# Patient Record
Sex: FEMALE | Race: WHITE
[De-identification: names, ages, dates, MRNs, and addresses within clinical notes are randomized per-mention and may not be internally consistent; named-entity substitution may affect disease eponyms.]

---

## 2019-08-06 ENCOUNTER — HOSPITAL ENCOUNTER (OUTPATIENT)
Dept: HOSPITAL 53 - M WHC | Age: 74
End: 2019-08-06
Attending: STUDENT IN AN ORGANIZED HEALTH CARE EDUCATION/TRAINING PROGRAM
Payer: MEDICARE

## 2019-08-06 DIAGNOSIS — M85.89: ICD-10-CM

## 2019-08-06 DIAGNOSIS — Z13.820: Primary | ICD-10-CM

## 2019-08-08 NOTE — DEXA
AP SPINE   L1 - L4   1.469    2.2      4.0

LT FEMUR   TOTAL   1.062    0.4      2.2

LT NECK      0.960   -0.6      1.4

RT FEMUR   TOTAL   1.084    0.6      2.4      

RT NECK      0.979   -0.4      1.5

TOTAL BODY   TOTAL

OTHER



COMMENTS:

Normal bone densitometry of the spine and hips.

The density of the spine has increased 5.7% since 09/27/2013.

The density of the left hip has increased 2.5% since 09/27/2013.

The density of the right hip has increased 2.9% since 09/27/2013.

The increased density of the spine does represent a significant change.

The increased density of the left hip does represent a significant change.

The increased density of the right hip does represent a significant change.



FOLLOW-UP:

Recommendation for the next bone density exam: 5 years.



FUAD

## 2019-11-25 ENCOUNTER — HOSPITAL ENCOUNTER (OUTPATIENT)
Dept: HOSPITAL 53 - M LAB REF | Age: 74
End: 2019-11-25
Attending: PHYSICIAN ASSISTANT
Payer: MEDICARE

## 2019-11-25 DIAGNOSIS — N39.0: Primary | ICD-10-CM

## 2019-11-25 LAB
APPEARANCE UR: CLEAR
BACTERIA UR QL AUTO: NEGATIVE
BILIRUB UR QL STRIP.AUTO: NEGATIVE
GLUCOSE UR QL STRIP.AUTO: (no result) MG/DL
HGB UR QL STRIP.AUTO: NEGATIVE
KETONES UR QL STRIP.AUTO: (no result) MG/DL
LEUKOCYTE ESTERASE UR QL STRIP.AUTO: NEGATIVE
NITRITE UR QL STRIP.AUTO: NEGATIVE
PH UR STRIP.AUTO: 6 UNITS (ref 5–9)
PROT UR QL STRIP.AUTO: NEGATIVE MG/DL
RBC # UR AUTO: 0 /HPF (ref 0–3)
SP GR UR STRIP.AUTO: 1.03 (ref 1–1.03)
SQUAMOUS #/AREA URNS AUTO: 0 /HPF (ref 0–6)
UROBILINOGEN UR QL STRIP.AUTO: 0.2 MG/DL (ref 0–2)
WBC #/AREA URNS AUTO: 1 /HPF (ref 0–3)

## 2019-12-02 ENCOUNTER — HOSPITAL ENCOUNTER (OUTPATIENT)
Dept: HOSPITAL 53 - M ED | Age: 74
Setting detail: OBSERVATION
LOS: 1 days | Discharge: HOME HEALTH | End: 2019-12-03
Attending: INTERNAL MEDICINE | Admitting: INTERNAL MEDICINE
Payer: MEDICARE

## 2019-12-02 VITALS — BODY MASS INDEX: 26.13 KG/M2 | HEIGHT: 62 IN | WEIGHT: 141.98 LBS

## 2019-12-02 VITALS — DIASTOLIC BLOOD PRESSURE: 67 MMHG | SYSTOLIC BLOOD PRESSURE: 143 MMHG

## 2019-12-02 DIAGNOSIS — Z88.0: ICD-10-CM

## 2019-12-02 DIAGNOSIS — Z88.5: ICD-10-CM

## 2019-12-02 DIAGNOSIS — Z79.899: ICD-10-CM

## 2019-12-02 DIAGNOSIS — R42: ICD-10-CM

## 2019-12-02 DIAGNOSIS — I10: ICD-10-CM

## 2019-12-02 DIAGNOSIS — K21.9: ICD-10-CM

## 2019-12-02 DIAGNOSIS — Z88.2: ICD-10-CM

## 2019-12-02 DIAGNOSIS — E87.1: ICD-10-CM

## 2019-12-02 DIAGNOSIS — E11.65: Primary | ICD-10-CM

## 2019-12-02 DIAGNOSIS — E78.49: ICD-10-CM

## 2019-12-02 DIAGNOSIS — E87.6: ICD-10-CM

## 2019-12-02 DIAGNOSIS — E03.9: ICD-10-CM

## 2019-12-02 LAB
ALBUMIN SERPL BCG-MCNC: 3.2 GM/DL (ref 3.2–5.2)
ALBUMIN SERPL BCG-MCNC: 3.4 GM/DL (ref 3.2–5.2)
ALT SERPL W P-5'-P-CCNC: 40 U/L (ref 12–78)
ALT SERPL W P-5'-P-CCNC: 43 U/L (ref 12–78)
B-OH-BUTYR SERPL-MCNC: 11.71 MG/DL (ref ?–2.81)
BASOPHILS # BLD AUTO: 0.1 10^3/UL (ref 0–0.2)
BASOPHILS NFR BLD AUTO: 0.8 % (ref 0–1)
BILIRUB CONJ SERPL-MCNC: 0.3 MG/DL (ref 0–0.2)
BILIRUB CONJ SERPL-MCNC: 0.3 MG/DL (ref 0–0.2)
BILIRUB SERPL-MCNC: 1 MG/DL (ref 0.2–1)
BILIRUB SERPL-MCNC: 1.3 MG/DL (ref 0.2–1)
BUN SERPL-MCNC: 17 MG/DL (ref 7–18)
BUN SERPL-MCNC: 21 MG/DL (ref 7–18)
CALCIUM SERPL-MCNC: 9.1 MG/DL (ref 8.8–10.2)
CALCIUM SERPL-MCNC: 9.9 MG/DL (ref 8.8–10.2)
CHLORIDE SERPL-SCNC: 84 MEQ/L (ref 98–107)
CHLORIDE SERPL-SCNC: 93 MEQ/L (ref 98–107)
CO2 SERPL-SCNC: 34 MEQ/L (ref 21–32)
CO2 SERPL-SCNC: 35 MEQ/L (ref 21–32)
CREAT SERPL-MCNC: 0.77 MG/DL (ref 0.55–1.3)
CREAT SERPL-MCNC: 1.08 MG/DL (ref 0.55–1.3)
EOSINOPHIL # BLD AUTO: 0.2 10^3/UL (ref 0–0.5)
EOSINOPHIL NFR BLD AUTO: 2.5 % (ref 0–3)
EST. AVERAGE GLUCOSE BLD GHB EST-MCNC: 344 MG/DL (ref 60–110)
GFR SERPL CREATININE-BSD FRML MDRD: 52.7 ML/MIN/{1.73_M2} (ref 39–?)
GFR SERPL CREATININE-BSD FRML MDRD: > 60 ML/MIN/{1.73_M2} (ref 39–?)
GLUCOSE SERPL-MCNC: 284 MG/DL (ref 70–100)
GLUCOSE SERPL-MCNC: 618 MG/DL (ref 70–100)
HCT VFR BLD AUTO: 44.1 % (ref 36–47)
HGB BLD-MCNC: 15.7 G/DL (ref 12–15.5)
LIPASE SERPL-CCNC: 116 U/L (ref 73–393)
LYMPHOCYTES # BLD AUTO: 2.1 10^3/UL (ref 1.5–5)
LYMPHOCYTES NFR BLD AUTO: 26.3 % (ref 24–44)
MAGNESIUM SERPL-MCNC: 2.1 MG/DL (ref 1.8–2.4)
MCH RBC QN AUTO: 32.3 PG (ref 27–33)
MCHC RBC AUTO-ENTMCNC: 35.6 G/DL (ref 32–36.5)
MCV RBC AUTO: 90.7 FL (ref 80–96)
MONOCYTES # BLD AUTO: 1 10^3/UL (ref 0–0.8)
MONOCYTES NFR BLD AUTO: 11.9 % (ref 0–5)
NEUTROPHILS # BLD AUTO: 4.6 10^3/UL (ref 1.5–8.5)
NEUTROPHILS NFR BLD AUTO: 58.1 % (ref 36–66)
PHOSPHATE SERPL-MCNC: 2.2 MG/DL (ref 2.5–4.9)
PLATELET # BLD AUTO: 248 10^3/UL (ref 150–450)
POTASSIUM SERPL-SCNC: 2.8 MEQ/L (ref 3.5–5.1)
POTASSIUM SERPL-SCNC: 3.7 MEQ/L (ref 3.5–5.1)
PROT SERPL-MCNC: 6.6 GM/DL (ref 6.4–8.2)
PROT SERPL-MCNC: 7.6 GM/DL (ref 6.4–8.2)
RBC # BLD AUTO: 4.86 10^6/UL (ref 4–5.4)
SODIUM SERPL-SCNC: 130 MEQ/L (ref 136–145)
SODIUM SERPL-SCNC: 137 MEQ/L (ref 136–145)
WBC # BLD AUTO: 8 10^3/UL (ref 4–10)

## 2019-12-02 PROCEDURE — 83930 ASSAY OF BLOOD OSMOLALITY: CPT

## 2019-12-02 PROCEDURE — 36415 COLL VENOUS BLD VENIPUNCTURE: CPT

## 2019-12-02 PROCEDURE — 82010 KETONE BODYS QUAN: CPT

## 2019-12-02 PROCEDURE — 83036 HEMOGLOBIN GLYCOSYLATED A1C: CPT

## 2019-12-02 PROCEDURE — 81001 URINALYSIS AUTO W/SCOPE: CPT

## 2019-12-02 PROCEDURE — 84100 ASSAY OF PHOSPHORUS: CPT

## 2019-12-02 PROCEDURE — 99284 EMERGENCY DEPT VISIT MOD MDM: CPT

## 2019-12-02 PROCEDURE — 80076 HEPATIC FUNCTION PANEL: CPT

## 2019-12-02 PROCEDURE — 96374 THER/PROPH/DIAG INJ IV PUSH: CPT

## 2019-12-02 PROCEDURE — 96361 HYDRATE IV INFUSION ADD-ON: CPT

## 2019-12-02 PROCEDURE — 72110 X-RAY EXAM L-2 SPINE 4/>VWS: CPT

## 2019-12-02 PROCEDURE — 85025 COMPLETE CBC W/AUTO DIFF WBC: CPT

## 2019-12-02 PROCEDURE — 83735 ASSAY OF MAGNESIUM: CPT

## 2019-12-02 PROCEDURE — 72072 X-RAY EXAM THORAC SPINE 3VWS: CPT

## 2019-12-02 PROCEDURE — 80048 BASIC METABOLIC PNL TOTAL CA: CPT

## 2019-12-02 PROCEDURE — 83690 ASSAY OF LIPASE: CPT

## 2019-12-02 RX ADMIN — POTASSIUM CHLORIDE SCH MLS/HR: 7.46 INJECTION, SOLUTION INTRAVENOUS at 22:33

## 2019-12-02 RX ADMIN — POTASSIUM CHLORIDE SCH MLS/HR: 7.46 INJECTION, SOLUTION INTRAVENOUS at 23:58

## 2019-12-02 RX ADMIN — POTASSIUM CHLORIDE SCH MLS/HR: 7.46 INJECTION, SOLUTION INTRAVENOUS at 21:21

## 2019-12-02 RX ADMIN — INSULIN LISPRO SCH UNITS: 100 INJECTION, SOLUTION INTRAVENOUS; SUBCUTANEOUS at 23:58

## 2019-12-02 RX ADMIN — POTASSIUM CHLORIDE SCH MLS/HR: 7.46 INJECTION, SOLUTION INTRAVENOUS at 20:04

## 2019-12-02 RX ADMIN — CEFDINIR SCH MG: 300 CAPSULE ORAL at 22:33

## 2019-12-02 RX ADMIN — INSULIN DETEMIR SCH UNITS: 100 INJECTION, SOLUTION SUBCUTANEOUS at 22:33

## 2019-12-02 NOTE — REP
Five views lumbar and three views thoracic spine:  12/02/2019.

 

Indication:  Thoracolumbar pain.

 

Comparison:  None.

 

Findings:

 

Idiopathic thoracolumbar scoliosis is present.  Advanced degenerative sequelae of

the thoracolumbar spine are noted.  There is no evidence of acute fracture.  No

erosive osseous lesions of the thoracolumbar spine are detected.  There is

minimal anterolisthesis of T10 on T11. Sliding-type hiatal hernia is noted.

 

Impression:

 

No acute osseous injury of the thoracolumbar spine.

 

Scoliosis.

 

Advanced degenerative sequelae.

 

 

Electronically Signed by

Theodore Price DO 12/02/2019 01:12 P

## 2019-12-02 NOTE — REP
Five views lumbar and three views thoracic spine:  12/02/2019.

 

Indication:  Thoracolumbar pain.

 

Comparison:  None.

 

Findings:

 

Idiopathic thoracolumbar scoliosis is present.  Advanced degenerative sequelae of

the thoracolumbar spine are noted.  There is no evidence of acute fracture.  No

erosive osseous lesions of the thoracolumbar spine are detected.  There is

minimal anterolisthesis of T10 on T11.

 

Impression:

 

No acute osseous injury of the thoracolumbar spine.

 

Scoliosis.

 

Advanced degenerative sequelae.

 

 

Electronically Signed by

Theodore Price DO 12/02/2019 01:05 P

## 2019-12-02 NOTE — HPEPDOC
General


Date of Admission





12/2/2019


Date of Service:  Dec 2, 2019


Primary Care Physician:  NOBLE GATES D.O.


Chief Complaint


The patient is a 74-year-old female admitted with a reason for visit of Flank 

And Abd Pain, Urinary Problem.





History of Present Illness


HPI: 


74-year-old female presents for foot burning and leg pain and feeling dizzy. She

reports this is been ongoing for the past few weeks. Also has noted  polydipsia 

polyuria. Given her thirst, she has increased her intake of sodas and juices. 

She denies any recent fever, chills, nausea, vomiting, abdominal pain, chest 

pain, shortness of breath. Reports she is compliant with all meds. Denies sick 

contacts and travel. Of note, she was recently started on cefdinir for 

sinusitis. Is otherwise doing well and has no other complaints. On admission was

found to have an A1c of 13, osmoles and serum 39, bicarbonate elevated at 34, no

anion gap, ketones in the urine. She will be admitted for hyperglycemia with 

ketones, however does not meet DKA or HHS. She denies any history of diabetes.





PMH:


Hypertension


Osteoarthritis


Vertigo


Hyperlipidemia


GERD


Sciatica


Hypothyroidism





Past Surgical Hx:


Per patient, none





Family Hx:


Father with CAD


Mother with stroke and hypertension





Social Hx:


Denies ever smoking, alcohol, illicit substances





ROS:


Constitutional: Denies fever, chills, night sweats, weight loss


HEENT: Denies headache, dysphagia


Skin: Denies any rashes or ulcers


Pulmonary: Denies wheezing, dyspnea,  cough


Cardiac: Denies chest pain, palpitations, edema, or wt gain 


GI: Denies nausea, vomiting, diarrhea, constipation, melena, hematochezia


Endo: admits polyuria, polydipsia, excessive thirst


MSK: Denies new pains or joint swelling. Admits b/l LE cramps/pain


Neurologic: Denies new numbness/tingling





PHYSICAL:


General exam: A&Ox3, NAD, resting comfortably 


HEENT: NCAT, EOMI, anicteric sclera, supple neck, dry mucous memb


Cardiac: RRR, 3/6 systolic murmur heard best left sternal border, No JVD or 

edema


Respiratory: CTAB, no w/r/r/


Extremity: 2+ radial and dorsalis pedis pulses, no calf tenderness


Skin: Pink, warm, dry. No visible necrosis or lesions. Soles of b/l feet are 

erythematous, but not warm to touch or tender. No rash or drainage


Msk: strength 5/5 x4, normal tone


Neuro: normal speech, no focal deficits, sensation & motor intact in all 

extremities, including b/l feet





LABORATORY DATA, MICROBIOLOGY: Please see below.





IMAGING STUDIES:


Thoracic spine x-ray: No acute osseous injury of the thoracolumbar spine. Scol

iosis. Advanced degenerative sequelae.


Lumbar spine xray: No acute osseous injury of the thoracolumbar  spine. 

Scoliosis. Advanced degenerative sequelae.





ASSESSMENT AND PLAN: This is a 74-year-old female with hypertension, arthritis, 

vertigo, hyperlipidemia, presenting for bilateral lower foot pain, polyuria, 

polydipsia, excessive thirst. Found on admission to have A1c of 13.6, ketones in

urine, however no anion gap or acidosis.


1. New onset diabetes: Patient is hyperglycemic, however does not meet criteria 

for DKA or HHS. Will need diabetic teaching and meat with teachers noticed. 

Start on low dose of Levemir and titrate up as needed. Insulin sliding scale on 

board. Will need frequent fingersticks in BMP strep tonight to monitor 

potassium, anion gap, bicarbonate, ketones. Will hydrate with fluids given her 

dehydrated status.





2. Hypokalemia: Likely 2/2 insulin administration. Closely monitor with frequent

BMPs. Oral and IV supplementation given. Potassium added on to her IV fluids as 

well.


 


3. Pseudohyponatremia: 2/2 hyperglycemia. Is normal when accounted for her blood

sugars. Monitor





4. Recent sinusitis: Patient is on cefdinir course started by urgent care. Okay 

to continue





For the remainder of her chronic conditions, continue home meds except for 

Chlorthalidone, which we will hold given her IV fluid hydration.





DVT prophylaxis: mechanical


DISPOSITION: Observe overnight to just insulin for her hyperglycemia and monitor

ketones and potassium. Likely discharge tomorrow.





Home Medications


Scheduled


Aspirin (Aspir 81) 81 Mg Tablet.dr, 1 TAB PO DAILY for pain, (Reported)





Miscellaneous Medications


Atorvastatin Calcium (Atorvastatin Calcium) 80 Mg Tablet, (Reported)


Cefdinir (Cefdinir) 300 Mg Capsule, (Reported)


Chlorthalidone (Chlorthalidone) 25 Mg Tablet, (Reported)


Ibuprofen (Ibuprofen) 600 Mg Tablet, (Reported)


Levothyroxine Sodium (Levothyroxine Sodium) 75 Mcg Tablet, (Reported)


Meclizine HCl (Meclizine HCl) 25 Mg Tablet, (Reported)


Omeprazole (Omeprazole) 20 Mg Capsule., (Reported)





Allergies


Coded Allergies:  


     Penicillins (Verified  Allergy, Mild, rash, 12/2/19)


     Sulfa (Sulfonamide Antibiotics) (Verified  Allergy, Mild, rash, 12/2/19)


     codeine (Verified  Allergy, Mild, rash, 12/2/19)


     erythromycin base (Verified  Allergy, Mild, rash, 12/2/19)


     morphine (Verified  Allergy, Mild, rash, 12/2/19)





ATTENDING NOTE


I have personally evaluated and examined the patient. Discussed with residents 

and student regarding plan of care and agree with the above assessment and plan.





A-FIB/CHADSVASC


A-FIB History


Current/History of A-Fib/PAF?:  No


Current PO Anticoag Therapy:  No





Vital Signs





Vital Signs








  Date Time  Temp Pulse Resp B/P (MAP) Pulse Ox O2 Delivery O2 Flow Rate FiO2


 


12/2/19 19:34 97.6 71 16 127/64 (85) 100 Room Air  











Laboratory Data


Labs 24H


Laboratory Tests 2


12/2/19 12:33: 


Urine Color STRAW, Urine Appearance CLEAR, Urine pH 6.0, Urine Specific Gravity 

1.027, Urine Protein NEGATIVE, Urine Glucose (UA) 3+H, Urine Ketones 1+H, Urine 

Blood NEGATIVE, Urine Nitrite NEGATIVE, Urine Bilirubin NEGATIVE, Urine 

Urobilinogen 0.2, Urine Leukocyte Esterase NEGATIVE, Urine WBC (Auto) 1, Urine 

RBC (Auto) 1, Urine Hyaline Casts (Auto) 0, Urine Bacteria (Auto) NEGATIVE, 

Urine Squamous Epithelial Cells 0, Urine Sperm (Auto) , Estimated Mean Plasma 

Glucose 344H, Hemoglobin A1c 13.6


12/2/19 12:39: 


Immature Granulocyte % (Auto) 0.4, Neutrophils (%) (Auto) 58.1, Lymphocytes (%) 

(Auto) 26.3, Monocytes (%) (Auto) 11.9H, Eosinophils (%) (Auto) 2.5, Basophils 

(%) (Auto) 0.8, Neutrophils # (Auto) 4.6, Lymphocytes # (Auto) 2.1, Monocytes # 

(Auto) 1.0H, Eosinophils # (Auto) 0.2, Basophils # (Auto) 0.1, Nucleated Red 

Blood Cells % (auto) 0.0, Anion Gap 12, Glomerular Filtration Rate 52.7, Calcium

Level 9.9, Phosphorus Level 2.2L, Magnesium Level 2.1, Total Bilirubin 1.3H, 

Direct Bilirubin 0.3H, Aspartate Amino Transf (AST/SGOT) 41H, Alanine Amino

transferase (ALT/SGPT) 43, Alkaline Phosphatase 124H, Total Protein 7.6, Albumin

3.4, Albumin/Globulin Ratio 0.81L, Lipase 116, B-Hydroxybutyrate 11.71H


12/2/19 13:54: Osmolality 309H


12/2/19 15:48: Bedside Glucose (Misc Panel) 330H


12/2/19 16:04: 


Anion Gap 9, Glomerular Filtration Rate > 60.0, Calcium Level 9.1, Total 

Bilirubin 1.0, Direct Bilirubin 0.3H, Aspartate Amino Transf (AST/SGOT) 36, 

Alanine Aminotransferase (ALT/SGPT) 40, Alkaline Phosphatase 115, Total Protein 

6.6, Albumin 3.2, Albumin/Globulin Ratio 0.94L, B-Hydroxybutyrate 17.25H


12/2/19 18:40: Osmolality 295


CBC/BMP


Laboratory Tests


12/2/19 12:39








12/2/19 16:04











Plan / VTE


VTE Prophylaxis Ordered?:  Yes











LYNN ZUÑIGA DO               Dec 2, 2019 20:09


SUSAN VLADEZ MD                 Dec 2, 2019 20:32

## 2019-12-03 VITALS — DIASTOLIC BLOOD PRESSURE: 68 MMHG | SYSTOLIC BLOOD PRESSURE: 144 MMHG

## 2019-12-03 VITALS — SYSTOLIC BLOOD PRESSURE: 136 MMHG | DIASTOLIC BLOOD PRESSURE: 68 MMHG

## 2019-12-03 VITALS — DIASTOLIC BLOOD PRESSURE: 63 MMHG | SYSTOLIC BLOOD PRESSURE: 141 MMHG

## 2019-12-03 VITALS — DIASTOLIC BLOOD PRESSURE: 67 MMHG | SYSTOLIC BLOOD PRESSURE: 155 MMHG

## 2019-12-03 LAB
B-OH-BUTYR SERPL-MCNC: 7.56 MG/DL (ref ?–2.81)
BUN SERPL-MCNC: 14 MG/DL (ref 7–18)
BUN SERPL-MCNC: 15 MG/DL (ref 7–18)
BUN SERPL-MCNC: 15 MG/DL (ref 7–18)
BUN SERPL-MCNC: 16 MG/DL (ref 7–18)
BUN SERPL-MCNC: 17 MG/DL (ref 7–18)
CALCIUM SERPL-MCNC: 8.1 MG/DL (ref 8.8–10.2)
CALCIUM SERPL-MCNC: 8.1 MG/DL (ref 8.8–10.2)
CALCIUM SERPL-MCNC: 8.2 MG/DL (ref 8.8–10.2)
CALCIUM SERPL-MCNC: 8.5 MG/DL (ref 8.8–10.2)
CALCIUM SERPL-MCNC: 8.6 MG/DL (ref 8.8–10.2)
CHLORIDE SERPL-SCNC: 100 MEQ/L (ref 98–107)
CHLORIDE SERPL-SCNC: 100 MEQ/L (ref 98–107)
CHLORIDE SERPL-SCNC: 101 MEQ/L (ref 98–107)
CHLORIDE SERPL-SCNC: 95 MEQ/L (ref 98–107)
CHLORIDE SERPL-SCNC: 97 MEQ/L (ref 98–107)
CO2 SERPL-SCNC: 26 MEQ/L (ref 21–32)
CO2 SERPL-SCNC: 29 MEQ/L (ref 21–32)
CREAT SERPL-MCNC: 0.64 MG/DL (ref 0.55–1.3)
CREAT SERPL-MCNC: 0.71 MG/DL (ref 0.55–1.3)
CREAT SERPL-MCNC: 0.75 MG/DL (ref 0.55–1.3)
CREAT SERPL-MCNC: 0.83 MG/DL (ref 0.55–1.3)
CREAT SERPL-MCNC: 0.92 MG/DL (ref 0.55–1.3)
GFR SERPL CREATININE-BSD FRML MDRD: > 60 ML/MIN/{1.73_M2} (ref 39–?)
GLUCOSE SERPL-MCNC: 184 MG/DL (ref 70–100)
GLUCOSE SERPL-MCNC: 213 MG/DL (ref 70–100)
GLUCOSE SERPL-MCNC: 297 MG/DL (ref 70–100)
GLUCOSE SERPL-MCNC: 319 MG/DL (ref 70–100)
GLUCOSE SERPL-MCNC: 335 MG/DL (ref 70–100)
MAGNESIUM SERPL-MCNC: 1.8 MG/DL (ref 1.8–2.4)
POTASSIUM SERPL-SCNC: 2.9 MEQ/L (ref 3.5–5.1)
POTASSIUM SERPL-SCNC: 3.1 MEQ/L (ref 3.5–5.1)
POTASSIUM SERPL-SCNC: 3.1 MEQ/L (ref 3.5–5.1)
POTASSIUM SERPL-SCNC: 3.2 MEQ/L (ref 3.5–5.1)
POTASSIUM SERPL-SCNC: 3.9 MEQ/L (ref 3.5–5.1)
SODIUM SERPL-SCNC: 134 MEQ/L (ref 136–145)
SODIUM SERPL-SCNC: 135 MEQ/L (ref 136–145)
SODIUM SERPL-SCNC: 136 MEQ/L (ref 136–145)
SODIUM SERPL-SCNC: 136 MEQ/L (ref 136–145)
SODIUM SERPL-SCNC: 137 MEQ/L (ref 136–145)

## 2019-12-03 RX ADMIN — POTASSIUM CHLORIDE AND SODIUM CHLORIDE SCH MLS/HR: 900; 150 INJECTION, SOLUTION INTRAVENOUS at 01:55

## 2019-12-03 RX ADMIN — IBUPROFEN SCH MG: 600 TABLET, FILM COATED ORAL at 09:05

## 2019-12-03 RX ADMIN — CEFDINIR SCH MG: 300 CAPSULE ORAL at 09:05

## 2019-12-03 RX ADMIN — INSULIN LISPRO SCH UNITS: 100 INJECTION, SOLUTION INTRAVENOUS; SUBCUTANEOUS at 04:55

## 2019-12-03 RX ADMIN — MECLIZINE HYDROCHLORIDE SCH MG: 25 TABLET ORAL at 09:05

## 2019-12-03 RX ADMIN — POTASSIUM CHLORIDE AND SODIUM CHLORIDE SCH MLS/HR: 900; 150 INJECTION, SOLUTION INTRAVENOUS at 12:03

## 2019-12-03 RX ADMIN — INSULIN LISPRO SCH UNITS: 100 INJECTION, SOLUTION INTRAVENOUS; SUBCUTANEOUS at 07:30

## 2019-12-03 RX ADMIN — MECLIZINE HYDROCHLORIDE SCH MG: 25 TABLET ORAL at 00:01

## 2019-12-03 RX ADMIN — IBUPROFEN SCH MG: 600 TABLET, FILM COATED ORAL at 00:00

## 2019-12-03 RX ADMIN — INSULIN LISPRO SCH UNITS: 100 INJECTION, SOLUTION INTRAVENOUS; SUBCUTANEOUS at 12:55

## 2019-12-03 RX ADMIN — INSULIN DETEMIR SCH UNITS: 100 INJECTION, SOLUTION SUBCUTANEOUS at 09:05

## 2019-12-03 NOTE — DSES
DATE OF ADMISSION:  12/02/2019

DATE OF DISCHARGE:  12/03/2019

 

My preceptor for this encounter is Dr. Shivam Piña.

 

DISCHARGE DIAGNOSES:

1.  Hyperglycemia secondary to newly diagnosed type 2 diabetes.

2.  Hypokalemia.

3.  Pseudohyponatremia.

 

CONSULTANTS:  None.

 

PROCEDURES:  None.

 

HOSPITAL COURSE:  This is a 74-year-old female who presented to the emergency

department on 12/02/2019 for burning foot and leg pain, as well as feeling 
dizzy.

She reports that these symptoms had been going on for the past few weeks. She 
has

been incredibly thirsty and has been urinating more. Because of her thirst, she

increased her intake of sodas and juices. She denied any recent fever, chills,

nausea, vomiting, abdominal pain, chest pain, shortness of breath.  She denied

recent sick contacts or travel.  She had been to the Urgent Care several days

prior and was recently started on cefdinir for sinusitis.  In the emergency

department, she was found to have an A1c of 13.6, a blood sugar of 618, and a

sodium of 130.  Sodium corrected for hyperglycemia was actually 138.  She also

had an elevated beta hydroxybutyrate at 17.25, which peaked about 7 hours later

at 28.

 

She was given insulin in the emergency department, and a repeat BMP showed

hypokalemia with a potassium of 2.8.   She was given a K run and given 40 mEq of

potassium by mouth.  Repeat BMP showed that her potassium was still low at 2.3,

and she continued to need potassium repletion throughout the night.  At noon on

the day of discharge, the patient's potassium had stabilized at 3.9.  Her sugars

still remained a little bit elevated in the 200-300 range.  The majority of her

symptoms resolved, and she was discharged home on insulin, Levemir 5 units twice

a day, as well as 500 mg of metformin by mouth.

 

PHYSICAL EXAMINATION:

Vital signs:  Temperature 97.9, pulse 71 and regular, respiratory rate 18, blood

pressure 155/67, pulse oximetry 95% on room air.

General:  This is an elderly obese female resting comfortably in bed.  She is 
not

in any acute distress.

HEENT:  Normocephalic, atraumatic.  Extraocular eye movements are intact.

Sclerae are anicteric.  Mucous membranes are moist.  The patient has some of her

own teeth but is missing quite a few.

Neck:  Supple, no jugular venous distention (JVD), no masses.

Heart:  Regular rate and rhythm.  No gallops or rubs, but there is a 3/6 
systolic

murmur heard best at the left sternal border.

Lungs:  Clear to auscultation bilaterally.  No wheezes, rhonchi or rales.

Extremities:  No clubbing, cyanosis or bilateral lower extremity edema.

Skin:  Pink, warm and dry.  No rashes.  She does have some chronic erythema of

the soles of her bilateral feet, these are not warm to touch or tender.  May be

secondary to chronic venous stasis changes.

Musculoskeletal:  No weakness noted.

Neurologic:  Muscle strength 5/5 bilaterally.  Sensation intact throughout.

Cranial nerves II-XII are intact.

 

LABORATORY DATA:

CBC:  From 12/02/2019, WBC 8.0, hemoglobin 15.7, hematocrit 44.1, platelets 248.

 

 

Chemistry:  Sodium 134, potassium 3.9, chloride 100, carbon dioxide 20, BUN 17,

creatinine 0.83, glucose 335, calcium 8.1.

 

Urine:  Positive for 3+ glucose and 1+ ketones.

 

Beta hydroxybutyrate improved during her admission.

 

IMAGING:

Thoracic spine x-ray done 12/02/2019 showed scoliosis and advanced degenerative

sequela.  No acute osseous injury of the thoracolumbar spine.

 

Lumbar spine x-ray:  Scoliosis and advanced degenerative sequelae.  No acute

osseous injury of the thoracolumbar spine.

 

DISCHARGE MEDICATIONS:

- glucose test strips

- Levemir pen 5 units subcutaneous twice a day, maximum daily dose 10 units

- metformin 500 mg tablets by mouth daily for 10 days

- Lia-C 1000 mg tablet by mouth every evening

- aspirin 81 mg by mouth daily

- atorvastatin 80 mg by mouth nightly

- ICap tablet one tablet by mouth every evening

- Caltrate 600 plus D plus tablet one tablet by mouth every evening

- cefdinir 300 mg by mouth every 12 hours

- chlorthalidone 25 mg by mouth daily

- vitamin B12 1000 mcg by mouth every evening

- Motrin 600 mg by mouth twice a day as needed

- Synthroid 75 mcg by mouth daily

- meclizine 25 mg by mouth three times a day

- Thera M plus tablet one tablet by mouth every evening

- Omega-3 1000 mg soft gel one capsule by mouth every evening

- Azo urinary pain relief 95 mg by mouth every evening

 

DIET:  Consistent-carbohydrate, diabetic education was given from dietary in the

hospital.

 

ACTIVITY:  As tolerated.

 

FOLLOWUP:  1 week with Dr. Farr in the [facility] at 2:30 p.m.  The

patient was made aware that if her blood sugars continue to be in the 300-400

range, to call primary care provider office and basal insulin will be adjusted

from there.



I, Shivam Piña, have independently examined this patient and performed my own 
physical exam, as well as reviewed the documentation and edited where necessary.
I have discussed in detail with the resident / student the findings and plan of 
treatment as documented by the resident / student and edited their note. I agree
with their findings and treatment plan and have edited their documentation. I 
will continue to follow the patient during this hospital stay.

FUAD

## 2019-12-09 ENCOUNTER — HOSPITAL ENCOUNTER (OUTPATIENT)
Dept: HOSPITAL 53 - M SFHCPLAZ | Age: 74
End: 2019-12-09
Attending: STUDENT IN AN ORGANIZED HEALTH CARE EDUCATION/TRAINING PROGRAM
Payer: MEDICARE

## 2019-12-09 DIAGNOSIS — E11.9: Primary | ICD-10-CM

## 2019-12-09 LAB
BUN SERPL-MCNC: 13 MG/DL (ref 7–18)
CALCIUM SERPL-MCNC: 10.5 MG/DL (ref 8.8–10.2)
CHLORIDE SERPL-SCNC: 97 MEQ/L (ref 98–107)
CO2 SERPL-SCNC: 32 MEQ/L (ref 21–32)
CREAT SERPL-MCNC: 0.59 MG/DL (ref 0.55–1.3)
GFR SERPL CREATININE-BSD FRML MDRD: > 60 ML/MIN/{1.73_M2} (ref 39–?)
GLUCOSE SERPL-MCNC: 183 MG/DL (ref 70–100)
POTASSIUM SERPL-SCNC: 3.4 MEQ/L (ref 3.5–5.1)
SODIUM SERPL-SCNC: 137 MEQ/L (ref 136–145)

## 2019-12-10 ENCOUNTER — HOSPITAL ENCOUNTER (OUTPATIENT)
Dept: HOSPITAL 53 - M SFHCPLAZ | Age: 74
End: 2019-12-10
Attending: FAMILY MEDICINE
Payer: MEDICARE

## 2019-12-10 DIAGNOSIS — Z53.8: ICD-10-CM

## 2019-12-10 DIAGNOSIS — E11.9: Primary | ICD-10-CM

## 2020-01-12 ENCOUNTER — HOSPITAL ENCOUNTER (OUTPATIENT)
Dept: HOSPITAL 53 - M ADAMS | Age: 75
End: 2020-01-12
Attending: STUDENT IN AN ORGANIZED HEALTH CARE EDUCATION/TRAINING PROGRAM
Payer: MEDICARE

## 2020-01-12 DIAGNOSIS — E11.9: Primary | ICD-10-CM

## 2020-01-12 LAB
BUN SERPL-MCNC: 13 MG/DL (ref 7–18)
CALCIUM SERPL-MCNC: 9.4 MG/DL (ref 8.8–10.2)
CHLORIDE SERPL-SCNC: 99 MEQ/L (ref 98–107)
CO2 SERPL-SCNC: 27 MEQ/L (ref 21–32)
CREAT SERPL-MCNC: 0.69 MG/DL (ref 0.55–1.3)
GFR SERPL CREATININE-BSD FRML MDRD: > 60 ML/MIN/{1.73_M2} (ref 39–?)
GLUCOSE SERPL-MCNC: 116 MG/DL (ref 70–100)
POTASSIUM SERPL-SCNC: 3.5 MEQ/L (ref 3.5–5.1)
SODIUM SERPL-SCNC: 138 MEQ/L (ref 136–145)

## 2020-01-24 ENCOUNTER — HOSPITAL ENCOUNTER (OUTPATIENT)
Dept: HOSPITAL 53 - M SFHCPLAZ | Age: 75
End: 2020-01-24
Attending: INTERNAL MEDICINE
Payer: MEDICARE

## 2020-01-24 DIAGNOSIS — E11.9: Primary | ICD-10-CM

## 2020-01-24 LAB
CREAT UR-MCNC: 32.3 MG/DL
EST. AVERAGE GLUCOSE BLD GHB EST-MCNC: 197 MG/DL (ref 60–110)
MICROALBUMIN UR-MCNC: 16.3 MG/L
MICROALBUMIN/CREAT UR: 50.4 MCG/MG (ref 0–30)

## 2020-04-24 ENCOUNTER — HOSPITAL ENCOUNTER (OUTPATIENT)
Dept: HOSPITAL 53 - M SFHCPLAZ | Age: 75
End: 2020-04-24
Attending: FAMILY MEDICINE
Payer: MEDICARE

## 2020-04-24 DIAGNOSIS — E11.9: Primary | ICD-10-CM

## 2020-04-24 DIAGNOSIS — I10: ICD-10-CM

## 2020-04-24 LAB
BUN SERPL-MCNC: 14 MG/DL (ref 7–18)
CALCIUM SERPL-MCNC: 10.2 MG/DL (ref 8.8–10.2)
CHLORIDE SERPL-SCNC: 99 MEQ/L (ref 98–107)
CHOLEST SERPL-MCNC: 158 MG/DL (ref ?–200)
CHOLEST/HDLC SERPL: 2.26 {RATIO} (ref ?–5)
CO2 SERPL-SCNC: 31 MEQ/L (ref 21–32)
CREAT SERPL-MCNC: 0.5 MG/DL (ref 0.55–1.3)
CREAT UR-MCNC: 29.9 MG/DL
EST. AVERAGE GLUCOSE BLD GHB EST-MCNC: 123 MG/DL (ref 60–110)
GFR SERPL CREATININE-BSD FRML MDRD: > 60 ML/MIN/{1.73_M2} (ref 39–?)
GLUCOSE SERPL-MCNC: 90 MG/DL (ref 70–100)
HDLC SERPL-MCNC: 70 MG/DL (ref 40–?)
LDLC SERPL CALC-MCNC: 71 MG/DL (ref ?–100)
MICROALBUMIN UR-MCNC: < 5 MG/L
MICROALBUMIN/CREAT UR: 16.7 MCG/MG (ref 0–30)
NONHDLC SERPL-MCNC: 88 MG/DL
POTASSIUM SERPL-SCNC: 4 MEQ/L (ref 3.5–5.1)
SODIUM SERPL-SCNC: 137 MEQ/L (ref 136–145)
TRIGL SERPL-MCNC: 83 MG/DL (ref ?–150)

## 2020-06-29 ENCOUNTER — HOSPITAL ENCOUNTER (OUTPATIENT)
Dept: HOSPITAL 53 - M SFHCPLAZ | Age: 75
End: 2020-06-29
Attending: FAMILY MEDICINE
Payer: MEDICARE

## 2020-06-29 DIAGNOSIS — E03.9: ICD-10-CM

## 2020-06-29 DIAGNOSIS — E11.9: ICD-10-CM

## 2020-06-29 DIAGNOSIS — I10: Primary | ICD-10-CM

## 2020-06-29 LAB
BUN SERPL-MCNC: 15 MG/DL (ref 7–18)
CALCIUM SERPL-MCNC: 10 MG/DL (ref 8.8–10.2)
CHLORIDE SERPL-SCNC: 94 MEQ/L (ref 98–107)
CO2 SERPL-SCNC: 31 MEQ/L (ref 21–32)
CREAT SERPL-MCNC: 0.61 MG/DL (ref 0.55–1.3)
EST. AVERAGE GLUCOSE BLD GHB EST-MCNC: 123 MG/DL (ref 60–110)
GFR SERPL CREATININE-BSD FRML MDRD: > 60 ML/MIN/{1.73_M2} (ref 39–?)
GLUCOSE SERPL-MCNC: 145 MG/DL (ref 70–100)
POTASSIUM SERPL-SCNC: 4.1 MEQ/L (ref 3.5–5.1)
SODIUM SERPL-SCNC: 132 MEQ/L (ref 136–145)
T4 FREE SERPL-MCNC: 1.5 NG/DL (ref 0.76–1.46)
TSH SERPL DL<=0.005 MIU/L-ACNC: 1.14 UIU/ML (ref 0.36–3.74)

## 2020-11-24 ENCOUNTER — HOSPITAL ENCOUNTER (OUTPATIENT)
Dept: HOSPITAL 53 - M SFHCADAM | Age: 75
End: 2020-11-24
Attending: STUDENT IN AN ORGANIZED HEALTH CARE EDUCATION/TRAINING PROGRAM
Payer: MEDICARE

## 2020-11-24 DIAGNOSIS — E11.9: Primary | ICD-10-CM

## 2020-11-24 DIAGNOSIS — E03.9: ICD-10-CM

## 2020-11-24 LAB
ALBUMIN SERPL BCG-MCNC: 3.9 GM/DL (ref 3.2–5.2)
ALT SERPL W P-5'-P-CCNC: 30 U/L (ref 12–78)
BILIRUB SERPL-MCNC: 0.8 MG/DL (ref 0.2–1)
BUN SERPL-MCNC: 16 MG/DL (ref 7–18)
CALCIUM SERPL-MCNC: 10.1 MG/DL (ref 8.8–10.2)
CHLORIDE SERPL-SCNC: 96 MEQ/L (ref 98–107)
CHOLEST SERPL-MCNC: 165 MG/DL (ref ?–200)
CHOLEST/HDLC SERPL: 2.12 {RATIO} (ref ?–5)
CO2 SERPL-SCNC: 31 MEQ/L (ref 21–32)
CREAT SERPL-MCNC: 0.56 MG/DL (ref 0.55–1.3)
EST. AVERAGE GLUCOSE BLD GHB EST-MCNC: 123 MG/DL (ref 60–110)
GFR SERPL CREATININE-BSD FRML MDRD: > 60 ML/MIN/{1.73_M2} (ref 39–?)
GLUCOSE SERPL-MCNC: 111 MG/DL (ref 70–100)
HDLC SERPL-MCNC: 78 MG/DL (ref 40–?)
LDLC SERPL CALC-MCNC: 74 MG/DL (ref ?–100)
NONHDLC SERPL-MCNC: 87 MG/DL
POTASSIUM SERPL-SCNC: 4.1 MEQ/L (ref 3.5–5.1)
PROT SERPL-MCNC: 7.2 GM/DL (ref 6.4–8.2)
SODIUM SERPL-SCNC: 134 MEQ/L (ref 136–145)
T4 FREE SERPL-MCNC: 1.55 NG/DL (ref 0.76–1.46)
TRIGL SERPL-MCNC: 66 MG/DL (ref ?–150)
TSH SERPL DL<=0.005 MIU/L-ACNC: 1.28 UIU/ML (ref 0.36–3.74)

## 2020-12-29 ENCOUNTER — HOSPITAL ENCOUNTER (OUTPATIENT)
Dept: HOSPITAL 53 - M LABDRWAD | Age: 75
End: 2020-12-29
Attending: STUDENT IN AN ORGANIZED HEALTH CARE EDUCATION/TRAINING PROGRAM
Payer: MEDICARE

## 2020-12-29 DIAGNOSIS — E87.6: Primary | ICD-10-CM

## 2020-12-29 LAB
ALBUMIN SERPL BCG-MCNC: 3.7 GM/DL (ref 3.2–5.2)
ALT SERPL W P-5'-P-CCNC: 32 U/L (ref 12–78)
BILIRUB SERPL-MCNC: 0.6 MG/DL (ref 0.2–1)
BUN SERPL-MCNC: 13 MG/DL (ref 7–18)
CALCIUM SERPL-MCNC: 9.7 MG/DL (ref 8.8–10.2)
CHLORIDE SERPL-SCNC: 97 MEQ/L (ref 98–107)
CO2 SERPL-SCNC: 30 MEQ/L (ref 21–32)
CREAT SERPL-MCNC: 0.56 MG/DL (ref 0.55–1.3)
GFR SERPL CREATININE-BSD FRML MDRD: > 60 ML/MIN/{1.73_M2} (ref 39–?)
GLUCOSE SERPL-MCNC: 122 MG/DL (ref 70–100)
POTASSIUM SERPL-SCNC: 4.5 MEQ/L (ref 3.5–5.1)
PROT SERPL-MCNC: 6.8 GM/DL (ref 6.4–8.2)
SODIUM SERPL-SCNC: 135 MEQ/L (ref 136–145)

## 2021-01-29 ENCOUNTER — HOSPITAL ENCOUNTER (OUTPATIENT)
Dept: HOSPITAL 53 - M LABDRWAD | Age: 76
End: 2021-01-29
Attending: STUDENT IN AN ORGANIZED HEALTH CARE EDUCATION/TRAINING PROGRAM
Payer: MEDICARE

## 2021-01-29 DIAGNOSIS — E11.9: Primary | ICD-10-CM

## 2021-01-29 LAB
ALBUMIN SERPL BCG-MCNC: 3.9 GM/DL (ref 3.2–5.2)
ALT SERPL W P-5'-P-CCNC: 35 U/L (ref 12–78)
BILIRUB SERPL-MCNC: 0.7 MG/DL (ref 0.2–1)
BUN SERPL-MCNC: 12 MG/DL (ref 7–18)
CALCIUM SERPL-MCNC: 10.6 MG/DL (ref 8.8–10.2)
CHLORIDE SERPL-SCNC: 95 MEQ/L (ref 98–107)
CO2 SERPL-SCNC: 31 MEQ/L (ref 21–32)
CREAT SERPL-MCNC: 0.59 MG/DL (ref 0.55–1.3)
GFR SERPL CREATININE-BSD FRML MDRD: > 60 ML/MIN/{1.73_M2} (ref 39–?)
GLUCOSE SERPL-MCNC: 91 MG/DL (ref 70–100)
POTASSIUM SERPL-SCNC: 4.4 MEQ/L (ref 3.5–5.1)
PROT SERPL-MCNC: 7.4 GM/DL (ref 6.4–8.2)
SODIUM SERPL-SCNC: 135 MEQ/L (ref 136–145)

## 2021-02-05 ENCOUNTER — HOSPITAL ENCOUNTER (OUTPATIENT)
Dept: HOSPITAL 53 - M SFHCPLAZ | Age: 76
End: 2021-02-05
Attending: FAMILY MEDICINE
Payer: MEDICARE

## 2021-02-05 DIAGNOSIS — E03.9: Primary | ICD-10-CM

## 2021-02-05 DIAGNOSIS — E11.9: ICD-10-CM

## 2021-02-05 DIAGNOSIS — E87.6: ICD-10-CM

## 2021-02-05 LAB
BUN SERPL-MCNC: 15 MG/DL (ref 7–18)
CALCIUM SERPL-MCNC: 11 MG/DL (ref 8.8–10.2)
CHLORIDE SERPL-SCNC: 96 MEQ/L (ref 98–107)
CO2 SERPL-SCNC: 32 MEQ/L (ref 21–32)
CREAT SERPL-MCNC: 0.54 MG/DL (ref 0.55–1.3)
CREAT UR-MCNC: 33.7 MG/DL
EST. AVERAGE GLUCOSE BLD GHB EST-MCNC: 128 MG/DL (ref 60–110)
GFR SERPL CREATININE-BSD FRML MDRD: > 60 ML/MIN/{1.73_M2} (ref 39–?)
GLUCOSE SERPL-MCNC: 108 MG/DL (ref 70–100)
MICROALBUMIN UR-MCNC: < 5 MG/L
MICROALBUMIN/CREAT UR: 14.8 MCG/MG (ref 0–30)
POTASSIUM SERPL-SCNC: 4.4 MEQ/L (ref 3.5–5.1)
SODIUM SERPL-SCNC: 134 MEQ/L (ref 136–145)
T4 FREE SERPL-MCNC: 1.47 NG/DL (ref 0.76–1.46)
TSH SERPL DL<=0.005 MIU/L-ACNC: 1.43 UIU/ML (ref 0.36–3.74)

## 2021-05-10 ENCOUNTER — HOSPITAL ENCOUNTER (OUTPATIENT)
Dept: HOSPITAL 53 - M SFHCPLAZ | Age: 76
End: 2021-05-10
Attending: FAMILY MEDICINE
Payer: MEDICARE

## 2021-05-10 DIAGNOSIS — E11.9: Primary | ICD-10-CM

## 2021-05-10 LAB
CREAT UR-MCNC: 44.4 MG/DL
EST. AVERAGE GLUCOSE BLD GHB EST-MCNC: 128 MG/DL (ref 60–110)
MICROALBUMIN UR-MCNC: 5.4 MG/L
MICROALBUMIN/CREAT UR: 12.1 MCG/MG (ref 0–30)

## 2021-06-23 ENCOUNTER — HOSPITAL ENCOUNTER (OUTPATIENT)
Dept: HOSPITAL 53 - M SFHCPLAZ | Age: 76
End: 2021-06-23
Attending: FAMILY MEDICINE
Payer: MEDICARE

## 2021-06-23 DIAGNOSIS — E87.6: Primary | ICD-10-CM

## 2021-06-23 DIAGNOSIS — E78.2: ICD-10-CM

## 2021-06-23 LAB
BUN SERPL-MCNC: 11 MG/DL (ref 7–18)
CALCIUM SERPL-MCNC: 10 MG/DL (ref 8.8–10.2)
CHLORIDE SERPL-SCNC: 96 MEQ/L (ref 98–107)
CHOLEST SERPL-MCNC: 161 MG/DL (ref ?–200)
CHOLEST/HDLC SERPL: 1.81 {RATIO} (ref ?–5)
CO2 SERPL-SCNC: 32 MEQ/L (ref 21–32)
CREAT SERPL-MCNC: 0.53 MG/DL (ref 0.55–1.3)
GFR SERPL CREATININE-BSD FRML MDRD: > 60 ML/MIN/{1.73_M2} (ref 39–?)
GLUCOSE SERPL-MCNC: 101 MG/DL (ref 70–100)
HDLC SERPL-MCNC: 89 MG/DL (ref 40–?)
LDLC SERPL CALC-MCNC: 60 MG/DL (ref ?–100)
NONHDLC SERPL-MCNC: 72 MG/DL
POTASSIUM SERPL-SCNC: 3.7 MEQ/L (ref 3.5–5.1)
SODIUM SERPL-SCNC: 133 MEQ/L (ref 136–145)
TRIGL SERPL-MCNC: 59 MG/DL (ref ?–150)

## 2021-07-07 ENCOUNTER — HOSPITAL ENCOUNTER (OUTPATIENT)
Dept: HOSPITAL 53 - M PLALAB | Age: 76
End: 2021-07-07
Attending: STUDENT IN AN ORGANIZED HEALTH CARE EDUCATION/TRAINING PROGRAM
Payer: MEDICARE

## 2021-07-07 DIAGNOSIS — E78.2: Primary | ICD-10-CM

## 2021-07-07 LAB
BUN SERPL-MCNC: 13 MG/DL (ref 7–18)
CALCIUM SERPL-MCNC: 9.7 MG/DL (ref 8.8–10.2)
CHLORIDE SERPL-SCNC: 97 MEQ/L (ref 98–107)
CO2 SERPL-SCNC: 29 MEQ/L (ref 21–32)
CREAT SERPL-MCNC: 0.48 MG/DL (ref 0.55–1.3)
GFR SERPL CREATININE-BSD FRML MDRD: > 60 ML/MIN/{1.73_M2} (ref 39–?)
GLUCOSE SERPL-MCNC: 90 MG/DL (ref 70–100)
POTASSIUM SERPL-SCNC: 3.5 MEQ/L (ref 3.5–5.1)
SODIUM SERPL-SCNC: 136 MEQ/L (ref 136–145)

## 2021-07-26 ENCOUNTER — HOSPITAL ENCOUNTER (OUTPATIENT)
Dept: HOSPITAL 53 - M PLALAB | Age: 76
End: 2021-07-26
Attending: STUDENT IN AN ORGANIZED HEALTH CARE EDUCATION/TRAINING PROGRAM
Payer: MEDICARE

## 2021-07-26 DIAGNOSIS — E87.6: Primary | ICD-10-CM

## 2021-07-26 LAB
BUN SERPL-MCNC: 15 MG/DL (ref 7–18)
CALCIUM SERPL-MCNC: 9.5 MG/DL (ref 8.8–10.2)
CHLORIDE SERPL-SCNC: 101 MEQ/L (ref 98–107)
CO2 SERPL-SCNC: 25 MEQ/L (ref 21–32)
CREAT SERPL-MCNC: 0.57 MG/DL (ref 0.55–1.3)
GFR SERPL CREATININE-BSD FRML MDRD: > 60 ML/MIN/{1.73_M2} (ref 39–?)
GLUCOSE SERPL-MCNC: 106 MG/DL (ref 70–100)
POTASSIUM SERPL-SCNC: 4 MEQ/L (ref 3.5–5.1)
SODIUM SERPL-SCNC: 136 MEQ/L (ref 136–145)

## 2021-10-21 ENCOUNTER — HOSPITAL ENCOUNTER (EMERGENCY)
Dept: HOSPITAL 53 - M ED | Age: 76
Discharge: HOME | End: 2021-10-21
Payer: MEDICARE

## 2021-10-21 VITALS — SYSTOLIC BLOOD PRESSURE: 125 MMHG | DIASTOLIC BLOOD PRESSURE: 67 MMHG

## 2021-10-21 VITALS — HEIGHT: 62 IN | BODY MASS INDEX: 23.05 KG/M2 | WEIGHT: 125.27 LBS

## 2021-10-21 DIAGNOSIS — K21.9: ICD-10-CM

## 2021-10-21 DIAGNOSIS — I10: ICD-10-CM

## 2021-10-21 DIAGNOSIS — Z88.1: ICD-10-CM

## 2021-10-21 DIAGNOSIS — Z88.0: ICD-10-CM

## 2021-10-21 DIAGNOSIS — Z88.6: ICD-10-CM

## 2021-10-21 DIAGNOSIS — M79.651: Primary | ICD-10-CM

## 2021-10-21 DIAGNOSIS — Z88.2: ICD-10-CM

## 2021-10-21 DIAGNOSIS — E11.9: ICD-10-CM

## 2021-10-21 NOTE — CCD
Summarization of Episode Note

                             Created on: 2021



MOHSEN TAVARES

External Reference #: [id number]

: 1945

Sex: Female



Demographics





                          Address                   [address] [place]  [address]

 

                          Home Phone                [phone]

 

                          Preferred Language        Unknown

 

                          Marital Status            Unknown

 

                          Taoism Affiliation     Unknown

 

                          Race                      White

 

                          Ethnic Group              Not  or 





Author





                          Author                    [facility]

ems

 

                          Organization              [facility]

ems

 

                          Address                   Unknown

 

                          Phone                     Unavailable







Support





                Name            Relationship    Address         Phone

 

                    MOHSEN TAVARES      GUAR                [address] [place]  [address]                 [phone]

 

                    Constance Murphy        ECON                [address] [place]  [address]                 [phone]







Care Team Providers





                    Care Team Member Name Role                Phone

 

                    Ta Montez  Unavailable         [phone]







PROBLEMS





          Type      Condition ICD9-CM Code FSQ19-RG Code Onset Dates Condition S

tatus W/U 

Status              Risk                SNOMED Code         Notes

 

       Problem Seasonal allergies        J30.2         Active confirmed        4

[id number]  

 

                          Problem                   Type 2 diabetes mellitus wit

hout complication, without long-term current

use of insulin         E11.9           Active  confirmed         [id number]  

 

       Problem Hypothyroidism (acquired)        E03.9         Active confirmed  

      [id number]  

 

       Problem Essential (primary) hypertension        I10           Active conf

irmed        [id number]  

 

       Problem Mixed hyperlipidemia        E78.2         Active confirmed       

 [id number]  

 

          Problem   Gastroesophageal reflux disease without esophagitis         

  K21.9               Active    

confirmed                               [id number]            







ALLERGIES





                    Allergen (clinical drug ingredient) Drug/Non Drug Allergy do

cumented on EMR 

Reaction            Allergy Type        Onset Date          Status

 

                      Penicillin (For Allergies Use Only) Rash       Drug Allerg

y            Active

 

           nisoldipine Sular(NDC Code:[address]-10) Rash       Drug Allergy    

        Active

 

           aspirin    Aspirin(NDC Code:[address]-53) Nausea/Vomiting Drug Aller

gy            Active

 

           codeine    Codeine Sulfate(NDC Code:[address]-18) Rash       Drug Al

lergy            Active

 

           erythromycin Erythromycin(NDC Code:[address]-61) Rash       Drug All

ergy            Active







ENCOUNTERS from 1945 to 2021





             Encounter    Location     Date         Provider     Diagnosis

 

                          [facility]         [address] 

[phone] [place] [address]

                            Ta Montez    Type 2 diabetes mine

itus without complication, 

without long-term current use of insulin E11.9 ; Hypokalemia E87.6 and Mixed 
hyperlipidemia E78.2







IMMUNIZATIONS





                Vaccine         Route           Administration Date Status

 

                Pneumococcal Adult 0.5mL Pneumovax 23 Unknown            Administered

 

                                        J&J/Ezequiel COVID- 19 (given elsewhere) 

SARS-COV-2 vaccine, vector non-

replicating, recombinant spike protein-Ad26, preservative free, 0.5 mL Unknown  

                 

May 06, 2021                            Administered

 

                Zoster 50mcg/0.5mL Shingrix Unknown         2019   Admi

nistered

 

                Influenza (High Dose 65 & up) Unknown         Oct 07, 2017    Ad

ministered

 

                TDAP 0.5mL (Boostrix) IM Intramuscular 2014    Administe

red

 

                Influenza 6mo & up Fluzone Unknown         2015   Admin

istered

 

                Pneumococcal Adult 0.5mL Pneumovax 23 IM Intramuscular Oct 02, 2

013    Administered

 

                Influenza 6mo & up Fluzone Unknown         Nov 10, 2014    Admin

istered

 

                Influenza (High Dose 65 & up) IM Intramuscular 2016    A

dministered

 

                Influenza 6mo & up Fluzone IM Intramuscular Oct 02, 2013    Admi

nistered

 

                Influenza 6mo & up Fluzone Unknown         2013   Admin

istered







SOCIAL HISTORY

Tobacco Use:



                    Social History Observation Description         Date

 

                    Details (start date - stop date) Never Smoker         



Sex Assigned At Birth:



                          Social History Observation Description

 

                          Sex Assigned At Birth     Unknown



Education:



                    Question            Answer              Notes

 

                    Level of Education: High School          



Audit



                    Question            Answer              Notes

 

                    Total Score:        0                    

 

                    Interpretation:     Alcohol Education    



Sexual Hx:



                    Question            Answer              Notes

 

                    Had sex in the last 12 months (vaginal, oral, or anal)? No  

                 



Drug and Alcohol



                    Question            Answer              Notes

 

                    Total Score:        0                    

 

                    Interpretation:     No problems reported  



BMI Care Goal Follow-Up



                    Question            Answer              Notes

 

                          Above Normal BMI Follow-Up Dietary management educatio

n, guidance, and 

counseling                               



Tobacco Use:



                    Question            Answer              Notes

 

                    Are you a:          never smoker        never smoker







REASON FOR REFERRAL

No Information



VITAL SIGNS





                    Weight              133.0 lbs           

 

                    Height              62 in               

 

                    BMI                 24.32 kg/m2         

 

                    Heart Rate          94 /min             

 

                    Respiratory Rate    18 /min             

 

                    Temperature         97.9 degrees Fahrenheit 

 

                    Oximetry            98                  

 

                    Blood pressure systolic 136 mm Hg           

 

                    Blood pressure diastolic 80 mm Hg            







MEDICATIONS





           Medication SIG (Take, Route, Frequency, Duration) Notes      Start Da

te End Date   

Status

 

           ICaps -    1 cap Orally Daily for 30 days                            

      Active

 

           Lia-C 500-550 MG 1 tab Orally Daily for 30 days                    

              Not-Taking

 

           MegaRed Omega-3 Krill Oil 500 MG 1 cap Orally Daily for 30 days      

                            Active

 

           Debrox 6.5 % ears Otic twice daily for 30 days                       

           Active

 

           Vitamin B12 1000 MCG 1 tablet Orally Once a day for 30 days          

                        Active

 

                metFORMIN HCl 1000 MG 1 tablet with a meal Orally bid for 30 day

s                 10 Dec, 2019

                                                    Active

 

                          Ibuprofen 600 MG          1 tablet with food or milk a

s needed Orally Three times a day 

for 90 days                                         Active

 

           Trulicity 0.75 MG/0.5ML as directed Subcutaneous weekly for 90 days  

                                Active

 

           Vitamin D-3 5000 UNIT 1 tablet Orally Once a day for 30 days         

                         Active

 

                    Caltrate 600+D 600-400 MG-UNIT 1 tablet with food Orally Onc

e a day for 30 days 

                                                            Active

 

           Chlorthalidone 25 25 mg 1 tab(s) oral daily for 90 days              

                    Active

 

                    Potassium Chloride ER 20 MEQ 1 tablet with food Orally Once 

a day for 30 day(s) 

                                                Active

 

             Doxycycline Monohydrate 100 MG 1 capsule Orally bid for 7 day(s)   

            

                                        Not-Taking

 

                          Meclizine HCl 25 mg       1 tablet Orally three times 

daily as needed for vertigo for 

90 days                                                         Active

 

                    Potassium Chloride ER 20 MEQ 1 tablet with food Orally Once 

a day for 14 day(s) 

                                                Active

 

           Multiple Minerals-Vitamins - 1 tablet Orally daily for 90 days       

                           Active

 

                Potassium Chloride 20 MEQ 1 packet with food Orally Once a day f

or 7 day(s)                 10

Dec, 2019                                           Not-Taking

 

           Aspir-81 81 MG 1 tablet Orally Once a day for 90 days                

                  Active

 

           Azo Tabs 95 MG 1 tab Orally Daily for 30 days                        

          Active

 

                          Baclofen 10 MG            1 tablet with food or milk p

rn Orally Three times a day for 90 

days                            26 Aug, 2016                    Active

 

           Synthroid 75 75 mcg 1 tablet Oral daily for 90 days                  

                Active

 

           Omeprazole 20mg 20mg 1 cap(s) oral daily for 90 days                 

                 Active

 

                Potassium Chloride 20 MEQ 1 packet with food Orally Once a day f

or 7 day(s)                 10

Dec, 2019                                           Active

 

           Claritin-D 12 Hour 5-120 MG 1 tablet as needed Orally daily for 90 da

ys                                  

Active

 

           Atorvastatin Calcium 80 MG 1 tablet Orally Once a day for 90 days    

                              Active







PROCEDURES

No Information



RESULTS





                    Component           Value               Reference Range

 

                                        LIPID PANEL (CARDIAC RISK)

Reviewed date:2021 12:39:32

Interpretation:

Performing Lab:[facility] [address] [address] [phone], Phone:[phone],[place] [address] 

 

                    TRIGLYCERIDES LEVEL 59                  <150

 

                    CHOLESTEROL LEVEL   161                 <200

 

                    HDL CHOLESTEROL     89                  >40

 

                    LDL CHOLESTEROL     60                  <100

 

                    NON-HDL-C           72                   

 

                    CHOLESTEROL RISK RATIO 1.808               <5

 

                                        Basic Metabolic Profile (BMP)

Reviewed date:2021 17:07:30

Interpretation:

Performing Lab:[facility] [address] [address] [phone], Phone:[phone],[place] [address] 

 

                    GLUCOSE, FASTING    101                 

 

                    BLOOD UREA NITROGEN 11                  7-18

 

                    CREATININE FOR GFR  0.53                0.55-1.30

 

                    GLOMERULAR FILTRATION RATE > 60.0              >39

 

                    SODIUM LEVEL        133                 136-145

 

                    POTASSIUM SERUM     3.7                 3.5-5.1

 

                    CHLORIDE LEVEL      96                  

 

                    CARBON DIOXIDE LEVEL 32                  21-32

 

                    CALCIUM LEVEL       10.0                8.8-10.2







REASON FOR VISIT

Follow up



MEDICAL (GENERAL) HISTORY





                    Type                Description         Date

 

                    Medical History     GERD                 

 

                    Medical History     HTN goal < 140/90 per [facility] 8  

 

                    Medical History     16.8% 10 yr ASCVD Risk (2019)  

 

                    Medical History     Sciatica             

 

                    Medical History     T2DM on Trulicity (hospitalized -12/

3 DKA)  

 

                    Medical History     J&J COVID dose administered 2021  

 

                    Surgical History    Denies surgery       

 

                    Hospitalization History DKA                 2019







Goals Section

No Information



Health Concerns

No Information



MEDICAL EQUIPMENT

No Information



MENTAL STATUS

No Information



FUNCTIONAL STATUS

No Information



ASSESSMENTS





             Encounter Date Diagnosis    Assessment Notes Treatment Notes Treatm

ent Clinical 

Notes

 

                                        Type 2 diabetes mellitus wit

hout complication, without long-term 

current use of insulin (ICD-10 - E11.9)                           



                                        



Patient has a history of type 2 diabetes mellitus with an HbA1c of 6.1 as of May
2021. Patient requires refills for her metformin. These have been placed.

 

                    Hypokalemia (ICD-10 - E87.6)                 



                                        



Patient takes oral potassium tablets for hypokalemia. BMP has been ordered to 
monitor potassium levels.

 

                    Mixed hyperlipidemia (ICD-10 - E78.2)           

      



                                        



Patient is due for fasting lipid panel check. Fasting lipid panel has been 
ordered. Patient states that she will have both her BMP and fasting lipid panel 
done tomorrow morning.







PLAN OF TREATMENT

Medication



                Medication Name Sig             Start Date      Stop Date

 

                    Caltrate 600+D 600-400 MG-UNIT 1 tablet with food Orally Onc

e a day for 30 days 

                                         

 

                    Potassium Chloride 20 MEQ 1 packet with food Orally Once a d

ay for 7 day(s) 10 

Dec, 2019                                

 

                    Potassium Chloride ER 20 MEQ 1 tablet with food Orally Once 

a day for 30 day(s) 

                             

 

                    Potassium Chloride ER 20 MEQ 1 tablet with food Orally Once 

a day for 14 day(s) 

                             

 

                metFORMIN HCl 1000 MG 1 tablet with a meal Orally bid for 30 day

s 10 Dec, 2019     



Treatment Notes



                    Assessment          Notes               Clinical Notes

 

                                        Type 2 diabetes mellitus without complic

ation, without long-term current use of 

insulin                                             Patient has a history of typ

e 2 diabetes mellitus with an HbA1c of 6.1

as of May 2021. Patient requires refills for her metformin. These have been 
placed.

 

                    Hypokalemia                             Patient takes oral p

otassium tablets for hypokalemia. BMP has been

ordered to monitor potassium levels.

 

                    Mixed hyperlipidemia                     Patient is due for 

fasting lipid panel check. Fasting 

lipid panel has been ordered. Patient states that she will have both her BMP and
fasting lipid panel done tomorrow morning.



Next Appt



                                        Details

 

                                        3 Months Reason:type 2 diabetes



Follow Up:3 Monthstype 2 diabetes



Insurance Providers





             Payer Name   Payer Address Payer Phone  Insured Name Patient Relati

onship to 

Insured                   Coverage Start Date       Coverage End Date

 

                    [employer] BLUE  [place] [id number] [phone]    MOHSEN TAVARES self

## 2021-10-21 NOTE — CCD
Summarization Of Episode

                             Created on: 10/21/2021



MOHSEN TAVARES

External Reference #: [id number]

: 1945

Sex: Female



Demographics





                          Address                   [address] [place]  [address]

 

                          Home Phone                [phone]

 

                          Preferred Language        English

 

                          Marital Status            Unknown

 

                          Synagogue Affiliation     Unknown

 

                          Race                      White

 

                          Ethnic Group              Not  or 





Author





                          Author                    [facility]              [facility]

 

                          Address                   Unknown

 

                          Phone                     Unavailable







Support





                Name            Relationship    Address         Phone

 

                RE              Next Of Kin     Unknown         Unavailable

 

                RETIRED         Next Of Kin     Unknown         [phone]

 

                UE              Next Of Kin     Unknown         Unavailable

 

                    CONSTANCE MURPHY       Next Of Kin         [address] [place]  [address]                 [phone]

 

                    Constance Murphy       ECON                [address] [place]  [address]                 [phone]



                                  



Re-disclosure Warning

          The records that you are about to access may contain information from 
federally-assisted alcohol or drug abuse programs. If such information is 
present, then the following federally mandated warning applies: This information
has been disclosed to you from records protected by federal confidentiality 
rules (42 CFR part 2). The federal rules prohibit you from making any further 
disclosure of this information unless further disclosure is expressly permitted 
by the written consent of the person to whom it pertains or as otherwise 
permitted by 42 CFR part 2. A general authorization for the release of medical 
or other information is NOT sufficient for this purpose. The Federal rules 
restrict any use of the information to criminally investigate or prosecute any 
alcohol or drug abuse patient.The records that you are about to access may 
contain highly sensitive health information, the redisclosure of which is 
protected by Article 27-F of the [facility] Public Health law. If you 
continue you may have access to information: Regarding HIV / AIDS; Provided by 
facilities licensed or operated by the [facility] Office of Mental Health; 
or Provided by the [facility] Office for People With Developmental 
Disabilities. If such information is present, then the following [place] 
mandated warning applies: This information has been disclosed to you from 
confidential records which are protected by state law. State law prohibits you 
from making any further disclosure of this information without the specific 
written consent of the person to whom it pertains, or as otherwise permitted by 
law. Any unauthorized further disclosure in violation of state law may result in
a fine or group home sentence or both. A general authorization for the release of 
medical or other information is NOT sufficient authorization for further disc
losure.                                                                         
    



Encounters

          



           Encounter  Providers  Location   Date       Indications Data Source(s

)

 

                Unknown                         [address] [place] [address] 2021 12:00:00 AM 

EDT                                                 [facility]CW[facility]

 

                Unknown                         [address] [facility] [address]

[place] [address] 2021 12:00:00 AM 

EDT                                                 eCW1 [facility]

 

                Unknown                         [address] [facility] [address]

[place] [address] 2021 12:00:00 AM 

EDT                                                 [facility]

 

                Unknown                         [address]

[place] [address] 2021 12:00:00 AM 

EDT                                                 eCW1 [facility]

 

                Unknown                         [address] [facility] [address]

[place] [address] 2021 12:00:00 AM 

EDT                                                 [facility]

 

                Unknown                         [address] [facility] [address]

[place] [address] 2021 12:00:00 AM 

EDT                                                 [facility]CW1 ([facility]

 

                Outpatient                      [address] [facility] [address]

[place] [address] 2021 12:00:00 AM

EDT                                                 eCW1 ([facility]

 

                Unknown                         [address] [facility] [address]

[place] [address] 2021 12:00:00 AM 

EDT                                                 [facility]CW1 ([facility]

 

                Outpatient                      [address]

[place] [address] 05/10/2021 12:00:00 AM

EDT                                                 eCW1 ([facility]

 

                Unknown                         [address]

[place] [address] 2021 12:00:00 AM 

EDT                                                 eCW1 [facility]

 

                Unknown                         [address] [facility] [address]

[place] [address] 2021 12:00:00 AM 

EDT                                                 eCW1 ([facility]

 

                Unknown                         [address]

[place] [address] 2021 12:00:00 AM 

EDT                                                 eCW1 ([facility]

 

                Unknown                         [address]

[place] [address] 2021 12:00:00 AM 

EDT                                                 [facility]CW1 ([facility]

 

                Unknown                         [address]

[place] [address] 2021 12:00:00 AM 

EST                                                 eCW1 ([facility]

 

                Unknown                         [address] [address]

[place] [address] 2021 12:00:00 AM 

EST                                                 eCW1 ([facility]

 

                Unknown                         [address] [address]

[place] [address] 2021 12:00:00 AM 

EST                                                 eCW1 ([facility]

 

                Outpatient                      [address]

[place] [address] 2021 12:00:00 AM

EST                                                 eCW1 ([facility]

 

                Unknown                         [address] [address]

[place] [address] 2021 12:00:00 AM 

EST                                                 eCW1 ([facility]

 

                Unknown                         [address] [address]

[place] [address] 2020 12:00:00 AM 

EST                                                 eCW1 ([facility]

 

                Unknown                         [address] [address]

[place] [address] 2020 12:00:00 AM 

EST                                                 eCW1 ([facility]

 

                Unknown                         [address]

[place] [address] 2020 12:00:00 AM 

EST                                                 eCW1 ([facility]

 

                Unknown                         [address]

[place] [address] 2020 12:00:00 AM 

EST                                                 eCW1 ([facility]

 

                Unknown                         [address] [address]

[place] [address] 2020 12:00:00 AM 

EST                                                 eCW1 ([facility])

 

                Unknown                         [address] [address]

[place] [address] 2020 12:00:00 AM 

EST                                                 eCW1 ([facility])

 

                Unknown                         [address]

[place] [address] 2020 12:00:00 AM 

EST                                                 eCW1 ([facility])

 

                Unknown                         [address] [address]

[place] [address] 2020 12:00:00 AM 

EST                                                 eCW1 ([facility])

 

                Unknown                         [address]

[place] [address] 2020 12:00:00 AM 

EST                                                 eCW1 ([facility])

 

                Unknown                         [address] [facility] [address]

[place] [address] 2020 12:00:00 AM 

EST                                                 eCW1 ([facility])

 

                Unknown                         [address]

[place] [address] 10/30/2020 12:00:00 AM 

EDT                                                 eCW1 ([facility])

 

                Unknown                         [address]

[place] [address] 2020 12:00:00 AM 

EDT                                                 eCW1 ([facility])



                                                                                
                                                                                
                                                                                
                                                                                
                                                      



Immunizations

          



             Vaccine      Date         Status       Description  Data Source(s)

 

                                        J&J/Ezequiel COVID- 19 (given elsewhere) 

SARS-COV-2 vaccine, vector non-

replicating, recombinant spike protein-Ad26, preservative free, 0.5 mL 

2021 02:37:00 PM EDT completed                               eCW1 ([facility])

 

                                        J&J/Ezequiel COVID- 19 (given elsewhere) 

SARS-COV-2 vaccine, vector non-

replicating, recombinant spike protein-Ad26, preservative free, 0.5 mL 

2021 02:37:00 PM EDT completed                               eCW1 ([facility])

 

                                        J&J/Ezequiel COVID- 19 (given elsewhere) 

SARS-COV-2 vaccine, vector non-

replicating, recombinant spike protein-Ad26, preservative free, 0.5 mL 

2021 02:37:00 PM EDT completed                               eCW1 ([facility])

 

                                        &J/Ezequiel COVID- 19 (given elsewhere) 

SARS-COV-2 vaccine, vector non-

replicating, recombinant spike protein-Ad26, preservative free, 0.5 mL 

2021 02:37:00 PM EDT completed                               eCW1 ([facility])

 

                                        J&J/Ezequiel COVID- 19 (given elsewhere) 

SARS-COV-2 vaccine, vector non-

replicating, recombinant spike protein-Ad26, preservative free, 0.5 mL 

2021 02:37:00 PM EDT completed                               eCW1 ([facility])

 

                                        &/[facility] COVID- 19 (given elsewhere) 

SARS-COV-2 vaccine, vector non-

replicating, recombinant spike protein-Ad26, preservative free, 0.5 mL 

2021 02:37:00 PM EDT completed                               eCW1 ([facility])

 

                                        J&J/Ezequiel COVID- 19 (given elsewhere) 

SARS-COV-2 vaccine, vector non-

replicating, recombinant spike protein-Ad26, preservative free, 0.5 mL 

2021 02:37:00 PM EDT completed                               eCW1 ([facility])

 

                                        J&J/Ezequiel COVID- 19 (given elsewhere) 

SARS-COV-2 vaccine, vector non-

replicating, recombinant spike protein-Ad26, preservative free, 0.5 mL 

2021 02:37:00 PM EDT completed                               eCW1 ([facility])

 

             COVID-19 VACCINE Ezequiel 2021 12:00:00 AM EDT completed      

           [facility]

 

                                        Vaccine Series Complete: YESThis Data wa

s Submitted to [facility] [employer]. 



                                                                                
                                                                                
      



Medications

          



          Medication Brand Name Start Date Product Form Dose      Route     Admi

nistrative 

Instructions Pharmacy Instructions Status     Indications Reaction   Description

 Data 

Source(s)

 

                                        Potassium Chloride 20 MEQ Extended Relea

se Oral Tablet Potassium Chloride ER 20 

MEQ             Potassium Chloride ER 20 MEQ 2021 12:00:00 AM EDT         

        1.0 

{tablet_with_food}                         active                  Potassium Chl

oride ER 20 MEQ eCW1 ([facility])

 

                                        Potassium Chloride 20 MEQ Extended Relea

se Oral Tablet Potassium Chloride ER 20 

MEQ             Potassium Chloride ER 20 MEQ 2021 12:00:00 AM EDT         

        1.0 

{tablet_with_food}                         active                  Potassium Chl

oride ER 20 MEQ eCW1 ([facility])

 

                                        Potassium Chloride 20 MEQ Extended Relea

se Oral Tablet Potassium Chloride ER 20 

MEQ             Potassium Chloride ER 20 MEQ 2021 12:00:00 AM EDT         

        1.0 

{tablet_with_food}                         active                  Potassium Chl

oride ER 20 MEQ eCW1 ([facility])

 

                                        Potassium Chloride 20 MEQ Extended Relea

se Oral Tablet Potassium Chloride ER 20 

MEQ             Potassium Chloride ER 20 MEQ 2021 12:00:00 AM EDT         

        1.0 

{tablet_with_food}                         active                  Potassium Chl

oride ER 20 MEQ eCW1 ([facility])

 

                                        Potassium Chloride 20 MEQ Extended Relea

se Oral Tablet Potassium Chloride ER 20 

MEQ             Potassium Chloride ER 20 MEQ 2021 12:00:00 AM EDT         

        1.0 

{tablet_with_food}                         active                  Potassium Chl

oride ER 20 MEQ eCW1 ([facility])

 

                                        Potassium Chloride 20 MEQ Extended Relea

se Oral Tablet Potassium Chloride ER 20 

MEQ             Potassium Chloride ER 20 MEQ 2021 12:00:00 AM EDT         

        1.0 

{tablet_with_food}                         active                  Potassium Chl

oride ER 20 MEQ eCW1 ([facility])

 

          Ibuprofen 600 MG Oral Tablet Ibuprofen 600 MG 2021 12:00:00 AM E

DT                                

                      active                           Ibuprofen 600 MG eCW1 ([facility])

 

          Ibuprofen 600 MG Oral Tablet Ibuprofen 600 MG 2021 12:00:00 AM E

DT                                

                      active                           Ibuprofen 600 MG eCW1 ([facility])

 

          Ibuprofen 600 MG Oral Tablet Ibuprofen 600 MG 2021 12:00:00 AM E

DT                                

                      active                           Ibuprofen 600 MG eCW1 ([facility])

 

          Ibuprofen 600 MG Oral Tablet Ibuprofen 600 MG 2021 12:00:00 AM E

DT                                

                      active                           Ibuprofen 600 MG eCW1 ([facility])

 

          Ibuprofen 600 MG Oral Tablet Ibuprofen 600 MG 2021 12:00:00 AM E

DT                                

                      active                           Ibuprofen 600 MG eCW1 ([facility])

 

          Ibuprofen 600 MG Oral Tablet Ibuprofen 600 MG 2021 12:00:00 AM E

DT                                

                      active                           Ibuprofen 600 MG eCW1 ([facility])

 

          Ibuprofen 600 MG Oral Tablet Ibuprofen 600 MG 2021 12:00:00 AM E

DT                                

                      active                           Ibuprofen 600 MG eCW1 ([facility])

 

          Ibuprofen 600 MG Oral Tablet Ibuprofen 600 MG 2021 12:00:00 AM E

DT                                

                      active                           Ibuprofen 600 MG eCW1 ([facility])

 

          Ibuprofen 600 MG Oral Tablet Ibuprofen 600 MG 2021 12:00:00 AM E

DT                                

                      active                           Ibuprofen 600 MG eCW1 ([facility])

 

          Ibuprofen 600 MG Oral Tablet Ibuprofen 600 MG 2021 12:00:00 AM E

DT                                

                      active                           Ibuprofen 600 MG eCW1 ([facility])

 

          Ibuprofen 600 MG Oral Tablet Ibuprofen 600 MG 2021 12:00:00 AM E

DT                                

                      active                           Ibuprofen 600 MG eCW1 ([facility])



                                                                                
                                                                                
                                                                  



Insurance Providers

          



             Payer name   Policy type / Coverage type Policy ID    Covered party

 ID Covered 

party's relationship to morales Policy Morales             Plan Information

 

          MEDICARE BLUE PPO      [id number]           [id number]           SP        

          [id number]

 

          MEDICARE BLUE PPO      306           [id number]           SP        

          [id number]

 

          [facility]         [id number] S                   VYM

[id number]

 

                    ANSI-Medicare Part B [id number]   

                            

[id number]

 

                    ANSI-Medicare Part B [id number]   

                            

[id number]

 

                    ANSI-Medicare Part B [id number]   

                            

[id number]

 

          MEDICARE BLUE PPO      306           [id number]           SP        

          [id number]

 

          [employer] P         [id number] S                   VYM

[id number]

 

          MEDICARE BLUE PPO P         [id number] S                  

 [id number]

 

          MEDICARE BLUE PPO P         [id number] S                  

 [id number]

 

                              CLM#00--2                               CLM

#05--2



                                                                                
                                                                                
                          



Problems, Conditions, and Diagnoses

          No Information                                                        
                               



Surgeries/Procedures

          No Information                                                        
                     



Results

          



                    ID                  Date                Data Source

 

                    Basic Metabolic Profile (BMP) 2021 12:00:00 AM EDT eCW

1 ([facility])









          Name      Value     Range     Interpretation Code Description Data Stephanie

rce(s) Supporting 

Document(s)

 

                    0.53      0.55-1.30           CREATININE FOR GFR eCW1 ([facility])  

 

                    101                     GLUCOSE, FASTING eCW1 ([facility])  

 

                    11        7-18                BLOOD UREA NITROGEN eCW1 ([facility])  

 

                    > 60.0    >39                 GLOMERULAR FILTRATION RATE eCW

1 ([facility]) 



 

                    133       136-145             SODIUM LEVEL eCW1 ([facility])  

 

                    3.7       3.5-5.1             POTASSIUM SERUM eCW1 ([facility])  

 

                    96                      CHLORIDE LEVEL eCW1 ([facility])  

 

                    10.0      8.8-10.2            CALCIUM LEVEL eCW1 ([facility])  

 

                    32        21-32               CARBON DIOXIDE LEVEL eCW1 ([facility])  









                    ID                  Date                Data Source

 

                    LIPID PANEL (CARDIAC RISK) 2021 12:00:00 AM EDT eCW1 (

[facility])









          Name      Value     Range     Interpretation Code Description Data Stephanie

rce(s) Supporting 

Document(s)

 

             Triglyceride [Mass/volume] in Serum or Plasma by calculation 59    

       <150                      

TRIGLYCERIDES LEVEL       eCW1 ([facility])  

 

             Cholesterol in LDL [Mass/volume] in Serum or Plasma by calculation 

60           <100                      LDL

 CHOLESTEROL              eCW1 ([facility])  

 

                    72                            NON-HDL-C eCW1 ([facility])  

 

           Cholesterol [Moles/volume] in Serum or Plasma 161        <200        

          CHOLESTEROL LEVEL eCW1 

([facility])         

 

           Cholesterol in HDL [Moles/volume] in Serum or Plasma 89         >40  

                 HDL CHOLESTEROL 

eCW1 ([facility])    

 

                    1.808     <5                  CHOLESTEROL RISK RATIO eCW1 ([facility])  









                    ID                  Date                Data Source

 

                    [id number]              05/10/2021 12:00:00 AM EDT eCW1 ([facility])









          Name      Value     Range     Interpretation Code Description Data Stephanie

rce(s) Supporting 

Document(s)

 

           Albumin/Creatinine [Mass Ratio] in Urine 5.4                         

     MALB URINE SIEMENS eCW1 

(Formerly [facility])         

 

           Microalbumin/Creatinine [Mass Ratio] in Urine 44.4                   

          CREATININE, URINE eCW1 

(Formerly [facility])         

 

           Microalbumin/Creatinine [Ratio] in Urine 12.1       0.0-30.0         

     TIM/CREAT RATIO eCW1 

(Formerly [facility])         









                    ID                  Date                Data Source

 

                    [id number]              05/10/2021 12:00:00 AM EDT eCW1 ([facility])









          Name      Value     Range     Interpretation Code Description Data Stephanie

rce(s) Supporting 

Document(s)

 

           Hemoglobin A1c/Hemoglobin.total in Blood 6.1                         

     HEMOGLOBIN A1c eCW1 ([facility])                    







                                        Procedure

 

                                          



                                                                                
                                                



Social History

          



           Code       Duration   Value      Status     Description Data Source(s

)

 

           Smoking    2021 12:00:00 AM EDT Never Smoker completed  Never S

moker eCW1 

([facility])

 

           Smoking    2021 12:00:00 AM EDT Never Smoker completed  Never S

moker eCW1 

([facility])

 

           Smoking    2021 12:00:00 AM EDT Never Smoker completed  Never S

moker eCW1 

([facility])

 

           Smoking    2021 12:00:00 AM EDT Never Smoker completed  Never S

moker eCW1 

([facility])

 

           Smoking    2021 12:00:00 AM EDT Never Smoker completed  Never S

moker eCW1 

([facility])

 

           Smoking    2021 12:00:00 AM EDT Never Smoker completed  Never S

moker eCW1 

([facility])

 

           Smoking    2021 12:00:00 AM EDT Never Smoker completed  Never S

moker eCW1 

([facility])

 

           Smoking    2021 12:00:00 AM EDT Never Smoker completed  Never S

moker eCW1 

([facility])

 

           Smoking    05/10/2021 12:00:00 AM EDT Never Smoker completed  Never S

moker eCW1 

([facility])

 

           Smoking    2021 12:00:00 AM EDT Never Smoker completed  Never S

moker eCW1 

([facility])

 

           Smoking    2021 12:00:00 AM EDT Never Smoker completed  Never S

moker eCW1 

([facility])

 

           Smoking    2021 12:00:00 AM EDT Never Smoker completed  Never S

moker eCW1 

([facility])

 

           Smoking    2021 12:00:00 AM EDT Never Smoker completed  Never S

moker eCW1 

([facility])

 

           Smoking    2021 12:00:00 AM EDT Never Smoker completed  Never S

moker eCW1 

([facility])

 

           Smoking    2021 12:00:00 AM EST Never Smoker completed  Never S

moker eCW1 

([facility])

 

           Smoking    2021 12:00:00 AM EST Never Smoker completed  Never S

moker eCW1 

([facility])

 

           Smoking    2021 12:00:00 AM EST Never Smoker completed  Never S

moker eCW1 

([facility])



                                                                                
                                                                                
                                                                                
                  



Vital Signs

          



                    ID                  Date                Data Source

 

                    UNK                                      









           Name       Value      Range      Interpretation Code Description Data

 Source(s)

 

           Body weight 133.0 [lb_av]                       133.0 [lb_av] eCW1 ([facility]ly [facility])

 

           Body height 62 [in_i]                        62 [in_i]  eCW1 ([facility])

 

           Body mass index (BMI) [Ratio] 24.32 kg/m2                       24.32

 kg/m2 eCW1 ([facility])

 

           Heart rate 94 /min                          94 /min    eCW1 ([facility])

 

           Respiratory rate 18 /min                          18 /min    eCW1 ([facility])

 

           Body temperature 97.9 [degF]                       97.9 [degF] eCW1 (

[facility])

 

           Systolic blood pressure 136 mm[Hg]                       136 mm[Hg] e

CW1 ([facility])

 

           Diastolic blood pressure 80 mm[Hg]                        80 mm[Hg]  

eCW1 ([facility])

 

           Body weight 136.8 [lb_av]                       136.8 [lb_av] eCW1 ([facility])

 

           Body height 62 [in_i]                        62 [in_i]  eCW1 ([facility])

 

           Body mass index (BMI) [Ratio] 25.02 kg/m2                       25.02

 kg/m2 eCW1 ([facility])

 

           Heart rate 88 /min                          88 /min    eCW1 ([facility])

 

           Respiratory rate 18 /min                          18 /min    eCW1 ([facility])

 

           Body temperature 99 [degF]                        99 [degF]  eCW1 ([facility])

 

           Systolic blood pressure 132 mm[Hg]                       132 mm[Hg] e

CW1 ([facility])

 

           Diastolic blood pressure 82 mm[Hg]                        82 mm[Hg]  

eCW1 ([facility])

 

           Body weight 138 [lb_av]                       138 [lb_av] eCW1 ([facility])

 

           Body height 62 [in_i]                        62 [in_i]  eCW1 ([facility])

 

           Body mass index (BMI) [Ratio] 25.24 kg/m2                       25.24

 kg/m2 eCW1 ([facility])

 

           Heart rate 98 /min                          98 /min    eCW1 ([facility])

 

           Respiratory rate 18 /min                          18 /min    eCW1 ([facility])

 

           Body temperature 98.3 [degF]                       98.3 [degF] eCW1 (

[facility])

 

           Systolic blood pressure 136 mm[Hg]                       136 mm[Hg] e

CW1 ([facility])

 

           Diastolic blood pressure 80 mm[Hg]                        80 mm[Hg]  

eCW1 ([facility])



                                                                                
                  



Patient Treatment Plan of Care

          



             Planned Activity Planned Date Details      Description  Data Source

(s)

 

                          Potassium Chloride 20 MEQ Extended Release Oral Tablet

 2021 12:00:00 AM 

EDT                                                         eCW1 ([facility])

 

                          Potassium Chloride 20 MEQ Extended Release Oral Tablet

 2021 12:00:00 AM 

EDT                                                         eCW1 ([facility])

 

                          Potassium Chloride 20 MEQ Extended Release Oral Tablet

 2021 12:00:00 AM 

EDT                                                         eCW1 ([facility])

 

                          Potassium Chloride 20 MEQ Extended Release Oral Tablet

 2021 12:00:00 AM 

EDT                                                         eCW1 ([facility])

 

                          Potassium Chloride 20 MEQ Extended Release Oral Tablet

 2021 12:00:00 AM 

EDT                                                         eCW1 ([facility])

 

                          Potassium Chloride 20 MEQ Extended Release Oral Tablet

 2021 12:00:00 AM 

EDT                                                         eCW1 ([facility])

 

             Ibuprofen 600 MG Oral Tablet 2021 12:00:00 AM EDT            

               eCW1 ([facility])

 

             Ibuprofen 600 MG Oral Tablet 2021 12:00:00 AM EDT            

               eCW1 ([facility])

 

             Ibuprofen 600 MG Oral Tablet 2021 12:00:00 AM EDT            

               eCW1 ([facility])

## 2021-10-21 NOTE — CCD
Summarization of Episode Note

                             Created on: 2021



MOHSEN TAVARES

External Reference #: [id number]

: 1945

Sex: Female



Demographics





                          Address                   [address] [place]  [address]

 

                          Home Phone                [phone]

 

                          Preferred Language        Unknown

 

                          Marital Status            Unknown

 

                          Pentecostal Affiliation     Unknown

 

                          Race                      White

 

                          Ethnic Group              Not  or 





Author





                          Author                    [facility]

ems

 

                          Organization              [facility]

ems

 

                          Address                   Unknown

 

                          Phone                     Unavailable







Support





                Name            Relationship    Address         Phone

 

                    MOHSEN TAVARES      GUAR                [address] [place]  [address]                 [phone]

 

                    Constance Murphy        ECON                [address] [place]  [address]                 [phone]







Care Team Providers





                    Care Team Member Name Role                Phone

 

                    Ta Montez  Unavailable         [phone]







PROBLEMS





          Type      Condition ICD9-CM Code OZG67-OV Code Onset Dates Condition S

tatus W/U 

Status              Risk                SNOMED Code         Notes

 

       Problem Seasonal allergies        J30.2         Active confirmed        4

[id number]  

 

                          Problem                   Type 2 diabetes mellitus wit

hout complication, without long-term current

use of insulin         E11.9           Active  confirmed         [id number]  

 

       Problem Hypothyroidism (acquired)        E03.9         Active confirmed  

      [id number]  

 

       Problem Essential (primary) hypertension        I10           Active conf

irmed        [id number]  

 

       Problem Mixed hyperlipidemia        E78.2         Active confirmed       

 [id number]  

 

          Problem   Gastroesophageal reflux disease without esophagitis         

  K21.9               Active    

confirmed                               [id number]            







ALLERGIES





                    Allergen (clinical drug ingredient) Drug/Non Drug Allergy do

cumented on EMR 

Reaction            Allergy Type        Onset Date          Status

 

                      Penicillin (For Allergies Use Only) Rash       Drug Allerg

y            Active

 

           nisoldipine Sular(NDC Code:[address]-10) Rash       Drug Allergy    

        Active

 

           aspirin    Aspirin(NDC Code:[address]-76) Nausea/Vomiting Drug Aller

gy            Active

 

           codeine    Codeine Sulfate(NDC Code:[address]-52) Rash       Drug Al

lergy            Active

 

           erythromycin Erythromycin(NDC Code:[address]-68) Rash       Drug All

ergy            Active







ENCOUNTERS from 1945 to 2021





             Encounter    Location     Date         Provider     Diagnosis

 

                          [facility]         [address] 

[address] [phone] [place] [address]

                            Ta Montez    Mixed hyperlipidemia

 E78.2







IMMUNIZATIONS





                Vaccine         Route           Administration Date Status

 

                Zoster 50mcg/0.5mL Shingrix Unknown         2019   Admi

nistered

 

                Influenza (High Dose 65 & up) Unknown         Oct 07, 2017    Ad

ministered

 

                Pneumococcal Adult 0.5mL Pneumovax 23 IM Intramuscular Oct 02, 2

013    Administered

 

                Pneumococcal Adult 0.5mL Pneumovax 23 Unknown         

013   Administered

 

                TDAP 0.5mL (Boostrix) IM Intramuscular 2014    Administe

red

 

                Influenza 6mo & up Fluzone Unknown         2015   Admin

istered

 

                                        J&J/Ezequiel COVID- 19 (given elsewhere) 

SARS-COV-2 vaccine, vector non-

replicating, recombinant spike protein-Ad26, preservative free, 0.5 mL Unknown  

                 

May 06, 2021                            Administered

 

                Influenza 6mo & up Fluzone Unknown         Nov 10, 2014    Admin

istered

 

                Influenza (High Dose 65 & up) IM Intramuscular 2016    A

dministered

 

                Influenza 6mo & up Fluzone IM Intramuscular Oct 02, 2013    Admi

nistered

 

                Influenza 6mo & up Fluzone Unknown         2013   Admin

istered







SOCIAL HISTORY

Tobacco Use:



                    Social History Observation Description         Date

 

                    Details (start date - stop date) Never Smoker         



Sex Assigned At Birth:



                          Social History Observation Description

 

                          Sex Assigned At Birth     Unknown



Education:



                    Question            Answer              Notes

 

                    Level of Education: High School          



Audit



                    Question            Answer              Notes

 

                    Total Score:        0                    

 

                    Interpretation:     Alcohol Education    



Sexual Hx:



                    Question            Answer              Notes

 

                    Had sex in the last 12 months (vaginal, oral, or anal)? No  

                 



Drug and Alcohol



                    Question            Answer              Notes

 

                    Total Score:        0                    

 

                    Interpretation:     No problems reported  



BMI Care Goal Follow-Up



                    Question            Answer              Notes

 

                          Above Normal BMI Follow-Up Dietary management educatio

n, guidance, and 

counseling                               



Tobacco Use:



                    Question            Answer              Notes

 

                    Are you a:          never smoker        never smoker







REASON FOR REFERRAL

No Information



VITAL SIGNS

No information



MEDICATIONS





           Medication SIG (Take, Route, Frequency, Duration) Notes      Start Da

te End Date   

Status

 

                          Baclofen 10 MG            1 tablet with food or milk p

rn Orally Three times a day for 90 

days                            26 Aug, 2016                    Active

 

           Atorvastatin Calcium 80 MG 1 tablet Orally Once a day for 90 days    

                              Active

 

             Doxycycline Monohydrate 100 MG 1 capsule Orally bid for 7 day(s)   

            

                                        Not-Taking

 

                          Ibuprofen 600 MG          1 tablet with food or milk a

s needed Orally Three times a day 

for 90                                                          Active

 

                    Caltrate 600+D 600-400 MG-UNIT 1 tablet with food Orally Onc

e a day for 30 days 

                                                            Active

 

           Vitamin B12 1000 MCG 1 tablet Orally Once a day for 30 days          

                        Active

 

                Potassium Chloride 20 MEQ 1 packet with food Orally Once a day f

or 7 day(s)                 10

Dec, 2019                                           Not-Taking

 

           Omeprazole 20mg 20mg 1 cap(s) oral daily for 90 days                 

                 Active

 

           Debrox 6.5 % ears Otic twice daily for 30 days                       

           Active

 

           Claritin-D 12 Hour 5-120 MG 1 tablet as needed Orally daily for 90 da

ys                                  

Active

 

           ICaps -    1 cap Orally Daily for 30 days                            

      Active

 

           MegaRed Omega-3 Krill Oil 500 MG 1 cap Orally Daily for 30 days      

                            Active

 

           Aspir-81 81 MG 1 tablet Orally Once a day for 90 days                

                  Active

 

           Chlorthalidone 25 25 mg 1 tab(s) oral daily for 90 days              

                    Active

 

                Potassium Chloride 20 MEQ 1 packet with food Orally Once a day f

or 7 day(s)                 10

Dec, 2019                                           Active

 

           Multiple Minerals-Vitamins - 1 tablet Orally daily for 90 days       

                           Active

 

           Vitamin D-3 5000 UNIT 1 tablet Orally Once a day for 30 days         

                         Active

 

                metFORMIN HCl 1000 MG 1 tablet with a meal Orally bid for 30 day

s                 10 Dec, 2019

                                                    Active

 

           Trulicity 0.75 MG/0.5ML as directed Subcutaneous weekly for 90 days  

                                Active

 

           Lia-C 500-550 MG 1 tab Orally Daily for 30 days                    

              Not-Taking

 

           Potassium Chloride ER 20 MEQ 1 tablet with food Orally Once a day for

 14                                  

Active

 

           Azo Tabs 95 MG 1 tab Orally Daily for 30 days                        

          Active

 

           Synthroid 75 75 mcg 1 tablet Oral daily for 90 days                  

                Active

 

                          Meclizine HCl 25 mg       1 tablet Orally three times 

daily as needed for vertigo for 

90 days                                                         Active







PROCEDURES

No Information



RESULTS

No Results



REASON FOR VISIT

New Refill Request



MEDICAL (GENERAL) HISTORY





                    Type                Description         Date

 

                    Medical History     GERD                 

 

                    Medical History     HTN goal < 140/90 per [facility] 8  

 

                    Medical History     16.8% 10 yr ASCVD Risk ()  

 

                    Medical History     Sciatica             

 

                    Medical History     T2DM on Trulicity (hospitalized -12/

3 DKA)  

 

                    Medical History     J&J COVID dose administered 2021  

 

                    Surgical History    Denies surgery       

 

                    Hospitalization History DKA                 2019







Goals Section

No Information



Health Concerns

No Information



MEDICAL EQUIPMENT

No Information



MENTAL STATUS

No Information



FUNCTIONAL STATUS

No Information



ASSESSMENTS





             Encounter Date Diagnosis    Assessment Notes Treatment Notes Treatm

ent Clinical 

Notes

 

                    Mixed hyperlipidemia (ICD-10 - E78.2)           

      



                                        











PLAN OF TREATMENT

Medication



                Medication Name Sig             Start Date      Stop Date

 

                metFORMIN HCl 1000 MG 1 tablet with a meal Orally bid for 30 day

s 10 Dec, 2019     

 

                    Potassium Chloride 20 MEQ 1 packet with food Orally Once a d

ay for 7 day(s) 10 

Dec, 2019                                

 

                Potassium Chloride ER 20 MEQ 1 tablet with food Orally Once a da

y for 14                  

 

                    Caltrate 600+D 600-400 MG-UNIT 1 tablet with food Orally Onc

e a day for 30 days 

                                         

 

                Atorvastatin Calcium 80 MG 1 tablet Orally Once a day for 90 day

s                  

 

                          Ibuprofen 600 MG          1 tablet with food or milk a

s needed Orally Three times a day 

for 90                                               







Insurance Providers





             Payer Name   Payer Address Payer Phone  Insured Name Patient Relati

onship to 

Insured                   Coverage Start Date       Coverage End Date

 

                    [facility] [place] [place] [address] 

[phone]    MOHSEN TAVARES self

## 2021-10-21 NOTE — REP
INDICATION:

pain.



COMPARISON:

None.



TECHNIQUE:

AP pelvis, AP and frogleg right hip.



FINDINGS:

There is no acute fracture, dislocation or intrinsic bone disease.  There are mild

degenerative changes at each hip joint, with mild joint space narrowing, subchondral

sclerosis and spurring.  Multiple phleboliths are seen in the pelvis.



IMPRESSION:

Mild degenerative changes bilateral hips with no evidence of fracture or dislocation.





<Electronically signed by Nomi Arana > 10/21/21 [id number]

## 2021-10-22 ENCOUNTER — HOSPITAL ENCOUNTER (INPATIENT)
Dept: HOSPITAL 53 - M ED | Age: 76
LOS: 6 days | DRG: 871 | End: 2021-10-28
Attending: FAMILY MEDICINE | Admitting: STUDENT IN AN ORGANIZED HEALTH CARE EDUCATION/TRAINING PROGRAM
Payer: MEDICARE

## 2021-10-22 VITALS — WEIGHT: 137.79 LBS | BODY MASS INDEX: 25.36 KG/M2 | HEIGHT: 62 IN

## 2021-10-22 DIAGNOSIS — Z88.5: ICD-10-CM

## 2021-10-22 DIAGNOSIS — J18.9: ICD-10-CM

## 2021-10-22 DIAGNOSIS — Z88.0: ICD-10-CM

## 2021-10-22 DIAGNOSIS — Z88.1: ICD-10-CM

## 2021-10-22 DIAGNOSIS — Z51.5: ICD-10-CM

## 2021-10-22 DIAGNOSIS — A41.9: Primary | ICD-10-CM

## 2021-10-22 DIAGNOSIS — I10: ICD-10-CM

## 2021-10-22 DIAGNOSIS — G03.9: ICD-10-CM

## 2021-10-22 DIAGNOSIS — K21.9: ICD-10-CM

## 2021-10-22 DIAGNOSIS — Z79.899: ICD-10-CM

## 2021-10-22 DIAGNOSIS — E87.2: ICD-10-CM

## 2021-10-22 DIAGNOSIS — J96.01: ICD-10-CM

## 2021-10-22 DIAGNOSIS — G04.90: ICD-10-CM

## 2021-10-22 DIAGNOSIS — E11.10: ICD-10-CM

## 2021-10-22 DIAGNOSIS — Z79.82: ICD-10-CM

## 2021-10-22 DIAGNOSIS — Z88.2: ICD-10-CM

## 2021-10-22 DIAGNOSIS — G93.41: ICD-10-CM

## 2021-10-22 LAB
ALBUMIN SERPL BCG-MCNC: 2.8 GM/DL (ref 3.2–5.2)
ALT SERPL W P-5'-P-CCNC: 92 U/L (ref 12–78)
APPEARANCE CSF: CLEAR
APPEARANCE CSF: CLEAR
BASE EXCESS BLDA CALC-SCNC: -2 MMOL/L (ref -2–2)
BASOPHILS # BLD AUTO: 0.1 10^3/UL (ref 0–0.2)
BASOPHILS NFR BLD AUTO: 0.7 % (ref 0–1)
BILIRUB CONJ SERPL-MCNC: 0.4 MG/DL (ref 0–0.2)
BILIRUB SERPL-MCNC: 1.2 MG/DL (ref 0.2–1)
BUN SERPL-MCNC: 21 MG/DL (ref 7–18)
BUN SERPL-MCNC: 25 MG/DL (ref 7–18)
CALCIUM SERPL-MCNC: 10.9 MG/DL (ref 8.8–10.2)
CALCIUM SERPL-MCNC: 9.3 MG/DL (ref 8.8–10.2)
CHLORIDE SERPL-SCNC: 104 MEQ/L (ref 98–107)
CHLORIDE SERPL-SCNC: 98 MEQ/L (ref 98–107)
CO2 BLDA CALC-SCNC: 18.8 MEQ/L (ref 23–31)
CO2 SERPL-SCNC: 21 MEQ/L (ref 21–32)
CO2 SERPL-SCNC: 23 MEQ/L (ref 21–32)
COLOR CSF: COLORLESS
COLOR CSF: COLORLESS
CREAT SERPL-MCNC: 0.61 MG/DL (ref 0.55–1.3)
CREAT SERPL-MCNC: 1.11 MG/DL (ref 0.55–1.3)
EOSINOPHIL # BLD AUTO: 0 10^3/UL (ref 0–0.5)
EOSINOPHIL NFR BLD AUTO: 0 % (ref 0–3)
GFR SERPL CREATININE-BSD FRML MDRD: 50.7 ML/MIN/{1.73_M2} (ref 39–?)
GFR SERPL CREATININE-BSD FRML MDRD: > 60 ML/MIN/{1.73_M2} (ref 39–?)
GLUCOSE CSF-MCNC: 85 MG/DL (ref 40–75)
GLUCOSE SERPL-MCNC: 134 MG/DL (ref 70–100)
GLUCOSE SERPL-MCNC: 146 MG/DL (ref 70–100)
HCO3 BLDA-SCNC: 18.2 MEQ/L (ref 22–26)
HCO3 STD BLDA-SCNC: 22.8 MEQ/L (ref 22–26)
HCT VFR BLD AUTO: 35.6 % (ref 36–47)
HCT VFR BLD AUTO: 40.6 % (ref 36–47)
HGB BLD-MCNC: 11.9 G/DL (ref 12–15.5)
HGB BLD-MCNC: 13.2 G/DL (ref 12–15.5)
LYMPHOCYTES # BLD AUTO: 0.5 10^3/UL (ref 1.5–5)
LYMPHOCYTES NFR BLD AUTO: 6.5 % (ref 24–44)
MCH RBC QN AUTO: 27 PG (ref 27–33)
MCH RBC QN AUTO: 27.2 PG (ref 27–33)
MCHC RBC AUTO-ENTMCNC: 32.5 G/DL (ref 32–36.5)
MCHC RBC AUTO-ENTMCNC: 33.4 G/DL (ref 32–36.5)
MCV RBC AUTO: 80.9 FL (ref 80–96)
MCV RBC AUTO: 83.7 FL (ref 80–96)
MONOCYTES # BLD AUTO: 0.1 10^3/UL (ref 0–0.8)
MONOCYTES NFR BLD AUTO: 0.8 % (ref 2–8)
NEUTROPHILS # BLD AUTO: 6.8 10^3/UL (ref 1.5–8.5)
NEUTROPHILS NFR BLD AUTO: 90.3 % (ref 36–66)
PCO2 BLDA: 20.8 MMHG (ref 35–45)
PH BLDA: 7.56 UNITS (ref 7.35–7.45)
PLATELET # BLD AUTO: 222 10^3/UL (ref 150–450)
PLATELET # BLD AUTO: 277 10^3/UL (ref 150–450)
PO2 BLDA: 71.2 MMHG (ref 75–100)
POTASSIUM SERPL-SCNC: 2.9 MEQ/L (ref 3.5–5.1)
POTASSIUM SERPL-SCNC: 3.6 MEQ/L (ref 3.5–5.1)
PROT CSF-MCNC: 76 MG/DL (ref 15–45)
PROT SERPL-MCNC: 6.7 GM/DL (ref 6.4–8.2)
RBC # BLD AUTO: 4.4 10^6/UL (ref 4–5.4)
RBC # BLD AUTO: 4.85 10^6/UL (ref 4–5.4)
RSV RNA NPH QL NAA+PROBE: NEGATIVE
SAO2 % BLDA: 95.3 % (ref 95–99)
SODIUM SERPL-SCNC: 134 MEQ/L (ref 136–145)
SODIUM SERPL-SCNC: 139 MEQ/L (ref 136–145)
TUBE # CSF: (no result)
WBC # BLD AUTO: 7.3 10^3/UL (ref 4–10)
WBC # BLD AUTO: 7.5 10^3/UL (ref 4–10)

## 2021-10-22 RX ADMIN — SODIUM CHLORIDE SCH MLS/HR: 9 INJECTION, SOLUTION INTRAVENOUS at 17:07

## 2021-10-22 RX ADMIN — ASPIRIN SCH MG: 81 TABLET ORAL at 18:09

## 2021-10-22 RX ADMIN — MECLIZINE HYDROCHLORIDE SCH MG: 25 TABLET ORAL at 18:10

## 2021-10-22 RX ADMIN — CHLORTHALIDONE SCH MG: 25 TABLET ORAL at 17:21

## 2021-10-22 RX ADMIN — DEXAMETHASONE SODIUM PHOSPHATE SCH MG: 4 INJECTION, SOLUTION INTRAMUSCULAR; INTRAVENOUS at 18:00

## 2021-10-22 RX ADMIN — LORATADINE SCH MG: 10 TABLET ORAL at 18:09

## 2021-10-22 RX ADMIN — OMEPRAZOLE SCH MG: 20 CAPSULE, DELAYED RELEASE ORAL at 18:10

## 2021-10-22 RX ADMIN — LEVOTHYROXINE SODIUM SCH MCG: 75 TABLET ORAL at 18:09

## 2021-10-22 NOTE — REP
INDICATION:

looking for uinary retention.



COMPARISON:

None.



TECHNIQUE:

Real-time transvesical sonographic evaluation of the urinary bladder with Doppler



FINDINGS:

Doppler shows uro jet phenomena at the UV junction bilaterally.



The pre void urinary bladder volume calculation is 51.9 cc.  The patient was unable to

void.  No large urinary bladder masses were identified.



IMPRESSION:

As above.  Port





<Electronically signed by Neo Saenz > 10/22/21 [id number]

## 2021-10-22 NOTE — REPVR
PROCEDURE INFORMATION: 

Exam: MR Lumbar Spine Without Contrast 

Exam date and time: 10/22/2021 10:55 AM 

Age: 76 years old 

Clinical indication: Low back pain and lumbago with sciatica; Bilateral; 

Additional info: Spinal canal stenosis, cauda equina? 



TECHNIQUE: 

Imaging protocol: Multiplanar magnetic resonance images of the lumbar spine 

without intravenous contrast. 



COMPARISON: 

CT Spine, lumbar w/o contrast 10/22/2021 1:13 AM 



FINDINGS:  Limited study secondary to motion artifact.  Specially T2 axial 

images are severely degraded by motion artifact.  Vertebral heights are 

maintained.  Loss of disc spaces at multiple levels with disc desiccation.  

Multilevel retropulsion anteriopulsion as described below.

Vertebrae:  Levoscoliosis of the lumbar spine.

Spinal cord: Normal signal. No cord compression. 

L1-L2:  4 mm retrolisthesis of L1 on L2 with posterior disc osteophyte 

formation, and bilateral facet joint arthropathy without any significant 

central spinal canal stenosis.  Severe bilateral neural foraminal narrowing, 

right greater than left with impingement on the exiting bilateral nerve roots.

L2-L3:  10.2 retrolisthesis of L2 on L3 with posterior disc osteophyte 

formation, bilateral facet joint arthropathy and ligamentum flavum hypertrophy 

resulting in severe central spinal canal stenosis, severe bilateral recess 

narrowing, and severe bilateral neural foraminal narrowing, with impingement of 

exiting bilateral L2 nerve roots and traversing bilateral L3 nerve roots.

L3-L4:  Diffuse disc bulge with right subarticular disc protrusion, bilateral 

facet joint arthropathy and ligamentum flavum hypertrophy resulting in severe 

central spinal canal stenosis, severe right lateral recess narrowing, mild left 

lateral recess narrowing, severe right and moderate left neural foraminal 

narrowing with impingement on exiting bilateral, right greater than left L3 

nerve roots and abutment of traversing right L4 nerve root.  

L4-L5:  5.4 mm anterolisthesis of L4 on L5 with uncovering of posterior disc, 

diffuse disc bulge with central disc protrusion measuring 5.3 x 7.3 mm, 

bilateral facet joint arthropathy and ligamentum flavum hypertrophy resulting 

in severe central spinal canal stenosis, severe bilateral recess narrowing, and 

severe bilateral neural foraminal narrowing with impingement on the exiting 

bilateral L4 and traversing bilateral L5 nerve roots.

L5-S1:  3.8 mm anterolisthesis of L4 on L5 with uncovering of posterior disc, 

diffuse disc bulge with bilateral facet joint arthropathy and ligamentum flavum 

hypertrophy without any significant central spinal canal stenosis, mild left 

lateral recess narrowing, and moderate to severe bilateral neural foraminal 

narrowing with impingement on the exiting bilateral L5 nerve roots.

Soft tissues: Unremarkable. 



IMPRESSION: 

Limited study secondary to motion artifact.  Specially T2 axial images are 

severely degraded by motion artifact.



Extensive degenerative changes as described in detail above.  Please see above 

dictation for individual levels.



Electronically signed by: Giulia Knowles On 10/22/2021  11:45:12 AM

## 2021-10-22 NOTE — CCD
Summarization Of Episode

                             Created on: 10/22/2021



MOHSEN TAVARES

External Reference #: [id number]

: 1945

Sex: Undifferentiated



Demographics





                          Address                   [address] [place]  [address]

 

                          Home Phone                [phone]

 

                          Preferred Language        English

 

                          Marital Status            Unknown

 

                          Sabianism Affiliation     Unknown

 

                          Race                      Unknown

 

                          Ethnic Group              Not  or 





Author





                          Author                    [facility]              [facility]

 

                          Address                   Unknown

 

                          Phone                     Unavailable







Support





                Name            Relationship    Address         Phone

 

                RE              Next Of Kin     Unknown         Unavailable

 

                RETIRED         Next Of Kin     Unknown         [phone]

 

                UE              Next Of Kin     Unknown         Unavailable

 

                    ANG NELSON       Next Of Kin         [address] [place]  [address]                 [phone]

 

                    ANG NELSON       ECON                [address] [place]  [address]                 [phone]



                                  



Re-disclosure Warning

          The records that you are about to access may contain information from 
federally-assisted alcohol or drug abuse programs. If such information is 
present, then the following federally mandated warning applies: This information
has been disclosed to you from records protected by federal confidentiality 
rules (42 CFR part 2). The federal rules prohibit you from making any further 
disclosure of this information unless further disclosure is expressly permitted 
by the written consent of the person to whom it pertains or as otherwise 
permitted by 42 CFR part 2. A general authorization for the release of medical 
or other information is NOT sufficient for this purpose. The Federal rules 
restrict any use of the information to criminally investigate or prosecute any 
alcohol or drug abuse patient.The records that you are about to access may 
contain highly sensitive health information, the redisclosure of which is 
protected by Article 27-F of the [facility] Public Health law. If you 
continue you may have access to information: Regarding HIV / AIDS; Provided by 
facilities licensed or operated by the [facility] Office of Mental Health; 
or Provided by the [facility] Office for People With Developmental 
Disabilities. If such information is present, then the following [place] 
mandated warning applies: This information has been disclosed to you from 
confidential records which are protected by state law. State law prohibits you 
from making any further disclosure of this information without the specific 
written consent of the person to whom it pertains, or as otherwise permitted by 
law. Any unauthorized further disclosure in violation of state law may result in
a fine or senior living sentence or both. A general authorization for the release of 
medical or other information is NOT sufficient authorization for further disc
losure.                                                                         
    



Encounters

          



           Encounter  Providers  Location   Date       Indications Data Source(s

)

 

                Unknown                         [address] [facility] [address] 2021 12:00:00 AM 

EDT                                                 [facility]

 

                Unknown                         [address] [facility]

[place] [address] 2021 12:00:00 AM 

EDT                                                 [facility]

 

                Unknown                         [address] [facility] [place] [address] 2021 12:00:00 AM 

EDT                                                 [facility]

 

                Unknown                         [address] [facility] [place] [address] 2021 12:00:00 AM 

EDT                                                 [facility]

 

                Unknown                         [address] [facility] [place] [address] 2021 12:00:00 AM 

EDT                                                 [facility]

 

                Unknown                         [address] [facility] [place] [address] 2021 12:00:00 AM 

EDT                                                 [facility]

 

                Outpatient                      [address] [facility] [place] [address] 2021 12:00:00 AM

EDT                                                 [facility]

 

                Unknown                         [address] [facility] [place] [address] 2021 12:00:00 AM 

EDT                                                 [facility]

 

                Outpatient                      [address] [facility] [place] [address] 05/10/2021 12:00:00 AM

EDT                                                 [facility]

 

                Unknown                         [address] [facility] [address] 2021 12:00:00 AM 

EDT                                                 [facility]

 

                Unknown                         [address] [facility] [place]

[place] [address] 2021 12:00:00 AM 

EDT                                                 [facility]

 

                Unknown                         [address] [facility] [place] [address] 2021 12:00:00 AM 

EDT                                                 [facility]

 

                Unknown                         [address] [facility] [address] 2021 12:00:00 AM 

EDT                                                 [facility]

 

                Unknown                         [address] [facility] [address] 2021 12:00:00 AM 

EST                                                 eCW1 [facility]

 

                Unknown                         [address] [facility] [address] 2021 12:00:00 AM 

EST                                                 [facility]

 

                Unknown                         [address] [facility] [place] [address] 2021 12:00:00 AM 

EST                                                 eCW[facility]

 

                Outpatient                      [address] [facility] [place] [address] 2021 12:00:00 AM

EST                                                 eCW1 [facility]

 

                Unknown                         [address] [facility] [place] [address] 2021 12:00:00 AM 

EST                                                 eCW[facility]

 

                Unknown                         [address] [facility] [address] 2020 12:00:00 AM 

EST                                                 eCW[facility]

 

                Unknown                         [address] [facility] [address] 2020 12:00:00 AM 

EST                                                 eCW[facility]

 

                Unknown                         [address] [facility] [address] 2020 12:00:00 AM 

EST                                                 eCW1 [facility]

 

                Unknown                         [address] [facility] [address] 2020 12:00:00 AM 

EST                                                 eCW1 [facility]

 

                Unknown                         [address] [facility] [address] 2020 12:00:00 AM 

EST                                                 eCW1 ([facility])

 

                Unknown                         [address] [facility] [address] 2020 12:00:00 AM 

EST                                                 eCW1 ([facility])

 

                Unknown                         [address] [facility] [address] 2020 12:00:00 AM 

EST                                                 eCW1 ([facility])

 

                Unknown                         [address] [facility] [address] 2020 12:00:00 AM 

EST                                                 eCW1 ([facility])

 

                Unknown                         [address] [facility] [address] 2020 12:00:00 AM 

EST                                                 eCW1 ([facility])

 

                Unknown                         [address] [facility] [place] [address] 2020 12:00:00 AM 

EST                                                 eCW1 ([facility])

 

                Unknown                         [address] [facility] [address] 10/30/2020 12:00:00 AM 

EDT                                                 eCW1 ([facility])

 

                Unknown                         [address] [facility] [address] 2020 12:00:00 AM 

EDT                                                 eCW1 ([facility])



                                                                                
                                                                                
                                                                                
                                                                                
                                                      



Immunizations

          



             Vaccine      Date         Status       Description  Data Source(s)

 

                                        J&J/Ezequiel COVID- 19 (given elsewhere) 

SARS-COV-2 vaccine, vector non-

replicating, recombinant spike protein-Ad26, preservative free, 0.5 mL 

2021 02:37:00 PM EDT completed                               eCW1 ([facility])

 

                                        J&J/Ezequiel COVID- 19 (given elsewhere) 

SARS-COV-2 vaccine, vector non-

replicating, recombinant spike protein-Ad26, preservative free, 0.5 mL 

2021 02:37:00 PM EDT completed                               eCW1 ([facility])

 

                                        J&J/Ezequiel COVID- 19 (given elsewhere) 

SARS-COV-2 vaccine, vector non-

replicating, recombinant spike protein-Ad26, preservative free, 0.5 mL 

2021 02:37:00 PM EDT completed                               eCW1 ([facility])

 

                                        &J/Ezequiel COVID- 19 (given elsewhere) 

SARS-COV-2 vaccine, vector non-

replicating, recombinant spike protein-Ad26, preservative free, 0.5 mL 

2021 02:37:00 PM EDT completed                               eCW1 ([facility])

 

                                        J&J/Ezequiel COVID- 19 (given elsewhere) 

SARS-COV-2 vaccine, vector non-

replicating, recombinant spike protein-Ad26, preservative free, 0.5 mL 

2021 02:37:00 PM EDT completed                               eCW1 ([facility])

 

                                        &/[facility] COVID- 19 (given elsewhere) 

SARS-COV-2 vaccine, vector non-

replicating, recombinant spike protein-Ad26, preservative free, 0.5 mL 

2021 02:37:00 PM EDT completed                               eCW1 ([facility])

 

                                        J&J/Ezequiel COVID- 19 (given elsewhere) 

SARS-COV-2 vaccine, vector non-

replicating, recombinant spike protein-Ad26, preservative free, 0.5 mL 

2021 02:37:00 PM EDT completed                               eCW1 ([facility])

 

                                        J&J/Ezequiel COVID- 19 (given elsewhere) 

SARS-COV-2 vaccine, vector non-

replicating, recombinant spike protein-Ad26, preservative free, 0.5 mL 

2021 02:37:00 PM EDT completed                               eCW1 ([facility])

 

             COVID-19 VACCINE Ezequiel 2021 12:00:00 AM EDT completed      

           [facility]

 

                                        Vaccine Series Complete: YESThis Data wa

s Submitted to [facility]. 



                                                                                
                                                                                
      



Medications

          



          Medication Brand Name Start Date Product Form Dose      Route     Admi

nistrative 

Instructions Pharmacy Instructions Status     Indications Reaction   Description

 Data 

Source(s)

 

                                        Potassium Chloride 20 MEQ Extended Relea

se Oral Tablet Potassium Chloride ER 20 

MEQ             Potassium Chloride ER 20 MEQ 2021 12:00:00 AM EDT         

        1.0 

{tablet_with_food}                         active                  Potassium Chl

oride ER 20 MEQ eCW1 ([facility])

 

                                        Potassium Chloride 20 MEQ Extended Relea

se Oral Tablet Potassium Chloride ER 20 

MEQ             Potassium Chloride ER 20 MEQ 2021 12:00:00 AM EDT         

        1.0 

{tablet_with_food}                         active                  Potassium Chl

oride ER 20 MEQ eCW1 ([facility])

 

                                        Potassium Chloride 20 MEQ Extended Relea

se Oral Tablet Potassium Chloride ER 20 

MEQ             Potassium Chloride ER 20 MEQ 2021 12:00:00 AM EDT         

        1.0 

{tablet_with_food}                         active                  Potassium Chl

oride ER 20 MEQ eCW1 ([facility])

 

                                        Potassium Chloride 20 MEQ Extended Relea

se Oral Tablet Potassium Chloride ER 20 

MEQ             Potassium Chloride ER 20 MEQ 2021 12:00:00 AM EDT         

        1.0 

{tablet_with_food}                         active                  Potassium Chl

oride ER 20 MEQ eCW1 ([facility])

 

                                        Potassium Chloride 20 MEQ Extended Relea

se Oral Tablet Potassium Chloride ER 20 

MEQ             Potassium Chloride ER 20 MEQ 2021 12:00:00 AM EDT         

        1.0 

{tablet_with_food}                         active                  Potassium Chl

oride ER 20 MEQ eCW1 ([facility])

 

                                        Potassium Chloride 20 MEQ Extended Relea

se Oral Tablet Potassium Chloride ER 20 

MEQ             Potassium Chloride ER 20 MEQ 2021 12:00:00 AM EDT         

        1.0 

{tablet_with_food}                         active                  Potassium Chl

oride ER 20 MEQ eCW1 ([facility])

 

          Ibuprofen 600 MG Oral Tablet Ibuprofen 600 MG 2021 12:00:00 AM E

DT                                

                      active                           Ibuprofen 600 MG eCW1 ([facility])

 

          Ibuprofen 600 MG Oral Tablet Ibuprofen 600 MG 2021 12:00:00 AM E

DT                                

                      active                           Ibuprofen 600 MG eCW1 ([facility])

 

          Ibuprofen 600 MG Oral Tablet Ibuprofen 600 MG 2021 12:00:00 AM E

DT                                

                      active                           Ibuprofen 600 MG eCW1 ([facility])

 

          Ibuprofen 600 MG Oral Tablet Ibuprofen 600 MG 2021 12:00:00 AM E

DT                                

                      active                           Ibuprofen 600 MG eCW1 ([facility])

 

          Ibuprofen 600 MG Oral Tablet Ibuprofen 600 MG 2021 12:00:00 AM E

DT                                

                      active                           Ibuprofen 600 MG eCW1 ([facility])

 

          Ibuprofen 600 MG Oral Tablet Ibuprofen 600 MG 2021 12:00:00 AM E

DT                                

                      active                           Ibuprofen 600 MG eCW1 ([facility])

 

          Ibuprofen 600 MG Oral Tablet Ibuprofen 600 MG 2021 12:00:00 AM E

DT                                

                      active                           Ibuprofen 600 MG eCW1 ([facility])

 

          Ibuprofen 600 MG Oral Tablet Ibuprofen 600 MG 2021 12:00:00 AM E

DT                                

                      active                           Ibuprofen 600 MG eCW1 ([facility])

 

          Ibuprofen 600 MG Oral Tablet Ibuprofen 600 MG 2021 12:00:00 AM E

DT                                

                      active                           Ibuprofen 600 MG eCW1 ([facility])

 

          Ibuprofen 600 MG Oral Tablet Ibuprofen 600 MG 2021 12:00:00 AM E

DT                                

                      active                           Ibuprofen 600 MG eCW1 ([facility])

 

          Ibuprofen 600 MG Oral Tablet Ibuprofen 600 MG 2021 12:00:00 AM E

DT                                

                      active                           Ibuprofen 600 MG eCW1 ([facility])



                                                                                
                                                                                
                                                                  



Insurance Providers

          



             Payer name   Policy type / Coverage type Policy ID    Covered party

 ID Covered 

party's relationship to morales Policy Morales             Plan Information

 

          MEDICARE BLUE PPO      [id number]           [id number]           SP        

          [id number]

 

          MEDICARE BLUE PPO      306           [id number]           SP        

          [id number]

 

          [facility]         [id number] S                   VYM

[id number]

 

                    ANSI-Medicare Part B [id number]   

                            

[id number]

 

                    ANSI-Medicare Part B [id number]   

                            

[id number]

 

                    ANSI-Medicare Part B [id number]   

                            

[id number]

 

          MEDICARE BLUE PPO      306           [id number]           SP        

          [id number]

 

          [employer] P         [id number] S                   VYM

[id number]

 

          MEDICARE BLUE PPO P         [id number] S                  

 [id number]

 

          MEDICARE BLUE PPO P         [id number] S                  

 [id number]

 

                              CLM#20--2                               CLM

#09--2



                                                                                
                                                                                
                          



Problems, Conditions, and Diagnoses

          No Information                                                        
                               



Surgeries/Procedures

          No Information                                                        
                     



Results

          



                    ID                  Date                Data Source

 

                    Basic Metabolic Profile (BMP) 2021 12:00:00 AM EDT eCW

1 ([facility])









          Name      Value     Range     Interpretation Code Description Data Stephanie

rce(s) Supporting 

Document(s)

 

                    0.53      0.55-1.30           CREATININE FOR GFR eCW1 ([facility])  

 

                    101                     GLUCOSE, FASTING eCW1 ([facility])  

 

                    11        7-18                BLOOD UREA NITROGEN eCW1 ([facility])  

 

                    > 60.0    >39                 GLOMERULAR FILTRATION RATE eCW

1 ([facility]) 



 

                    133       136-145             SODIUM LEVEL eCW1 ([facility])  

 

                    3.7       3.5-5.1             POTASSIUM SERUM eCW1 ([facility])  

 

                    96                      CHLORIDE LEVEL eCW1 ([facility])  

 

                    10.0      8.8-10.2            CALCIUM LEVEL eCW1 ([facility])  

 

                    32        21-32               CARBON DIOXIDE LEVEL eCW1 ([facility])  









                    ID                  Date                Data Source

 

                    LIPID PANEL (CARDIAC RISK) 2021 12:00:00 AM EDT eCW1 (

[facility])









          Name      Value     Range     Interpretation Code Description Data Stephanie

rce(s) Supporting 

Document(s)

 

             Triglyceride [Mass/volume] in Serum or Plasma by calculation 59    

       <150                      

TRIGLYCERIDES LEVEL       eCW1 ([facility])  

 

             Cholesterol in LDL [Mass/volume] in Serum or Plasma by calculation 

60           <100                      LDL

 CHOLESTEROL              eCW1 ([facility])  

 

                    72                            NON-HDL-C eCW1 ([facility])  

 

           Cholesterol [Moles/volume] in Serum or Plasma 161        <200        

          CHOLESTEROL LEVEL eCW1 

([facility])         

 

           Cholesterol in HDL [Moles/volume] in Serum or Plasma 89         >40  

                 HDL CHOLESTEROL 

eCW1 ([facility])    

 

                    1.808     <5                  CHOLESTEROL RISK RATIO eCW1 ([facility])  









                    ID                  Date                Data Source

 

                    [id number]              05/10/2021 12:00:00 AM EDT eCW1 ([facility])









          Name      Value     Range     Interpretation Code Description Data Stephanie

rce(s) Supporting 

Document(s)

 

           Albumin/Creatinine [Mass Ratio] in Urine 5.4                         

     MALB URINE SIEMENS eCW1 

([facility])         

 

           Microalbumin/Creatinine [Mass Ratio] in Urine 44.4                   

          CREATININE, URINE eCW1 

([facility])         

 

           Microalbumin/Creatinine [Ratio] in Urine 12.1       0.0-30.0         

     TIM/CREAT RATIO eCW1 

([facility])         









                    ID                  Date                Data Source

 

                    [id number]              05/10/2021 12:00:00 AM EDT eCW1 ([facility])









          Name      Value     Range     Interpretation Code Description Data Stephanie

rce(s) Supporting 

Document(s)

 

           Hemoglobin A1c/Hemoglobin.total in Blood 6.1                         

     HEMOGLOBIN A1c eCW1 ([facility])                    







                                        Procedure

 

                                          



                                                                                
                                                



Social History

          



           Code       Duration   Value      Status     Description Data Source(s

)

 

           Smoking    2021 12:00:00 AM EDT Never Smoker completed  Never S

moker eCW1 

([facility])

 

           Smoking    2021 12:00:00 AM EDT Never Smoker completed  Never S

moker eCW1 

([facility])

 

           Smoking    2021 12:00:00 AM EDT Never Smoker completed  Never S

moker eCW1 

([facility])

 

           Smoking    2021 12:00:00 AM EDT Never Smoker completed  Never S

moker eCW1 

([facility])

 

           Smoking    2021 12:00:00 AM EDT Never Smoker completed  Never S

moker eCW1 

([facility])

 

           Smoking    2021 12:00:00 AM EDT Never Smoker completed  Never S

moker eCW1 

([facility])

 

           Smoking    2021 12:00:00 AM EDT Never Smoker completed  Never S

moker eCW1 

([facility])

 

           Smoking    2021 12:00:00 AM EDT Never Smoker completed  Never S

moker eCW1 

([facility])

 

           Smoking    05/10/2021 12:00:00 AM EDT Never Smoker completed  Never S

moker eCW1 

([facility])

 

           Smoking    2021 12:00:00 AM EDT Never Smoker completed  Never S

moker eCW1 

([facility])

 

           Smoking    2021 12:00:00 AM EDT Never Smoker completed  Never S

moker eCW1 

([facility])

 

           Smoking    2021 12:00:00 AM EDT Never Smoker completed  Never S

moker eCW1 

([facility])

 

           Smoking    2021 12:00:00 AM EDT Never Smoker completed  Never S

moker eCW1 

([facility])

 

           Smoking    2021 12:00:00 AM EDT Never Smoker completed  Never S

moker eCW1 

([facility])

 

           Smoking    2021 12:00:00 AM EST Never Smoker completed  Never S

moker eCW1 

([facility])

 

           Smoking    2021 12:00:00 AM EST Never Smoker completed  Never S

moker eCW1 

([facility])

 

           Smoking    2021 12:00:00 AM EST Never Smoker completed  Never S

moker eCW1 

([facility])



                                                                                
                                                                                
                                                                                
                  



Vital Signs

          



                    ID                  Date                Data Source

 

                    UNK                                      









           Name       Value      Range      Interpretation Code Description Data

 Source(s)

 

           Systolic blood pressure 136 mm[Hg]                       136 mm[Hg] e

CW1 ([facility])

 

           Body weight 133.0 [lb_av]                       133.0 [lb_av] eCW1 ([facility])

 

           Body height 62 [in_i]                        62 [in_i]  eCW1 ([facility])

 

           Body mass index (BMI) [Ratio] 24.32 kg/m2                       24.32

 kg/m2 W1 ([facility])

 

           Heart rate 94 /min                          94 /min    eCW1 ([facility])

 

           Respiratory rate 18 /min                          18 /min    eCW1 ([facility])

 

           Body temperature 97.9 [degF]                       97.9 [degF] eCW1 (

[facility])

 

           Diastolic blood pressure 80 mm[Hg]                        80 mm[Hg]  

eCW1 ([facility])

 

           Body height 62 [in_i]                        62 [in_i]  eCW1 ([facility])

 

           Body mass index (BMI) [Ratio] 25.02 kg/m2                       25.02

 kg/m2 eCW1 ([facility])

 

           Heart rate 88 /min                          88 /min    eCW1 ([facility])

 

           Respiratory rate 18 /min                          18 /min    eCW1 ([facility])

 

           Body temperature 99 [degF]                        99 [degF]  eCW1 ([facility])

 

           Systolic blood pressure 132 mm[Hg]                       132 mm[Hg] e

CW1 ([facility])

 

           Diastolic blood pressure 82 mm[Hg]                        82 mm[Hg]  

eCW1 ([facility])

 

           Body weight 136.8 [lb_av]                       136.8 [lb_av] eCW1 ([facility])

 

           Body weight 138 [lb_av]                       138 [lb_av] eCW1 ([facility])

 

           Body height 62 [in_i]                        62 [in_i]  eCW1 ([facility])

 

           Body mass index (BMI) [Ratio] 25.24 kg/m2                       25.24

 kg/m2 eCW1 ([facility])

 

           Heart rate 98 /min                          98 /min    eCW1 ([facility])

 

           Respiratory rate 18 /min                          18 /min    eCW1 ([facility])

 

           Body temperature 98.3 [degF]                       98.3 [degF] eCW1 (

[facility])

 

           Systolic blood pressure 136 mm[Hg]                       136 mm[Hg] e

CW1 ([facility])

 

           Diastolic blood pressure 80 mm[Hg]                        80 mm[Hg]  

eCW1 ([facility])



                                                                                
                  



Patient Treatment Plan of Care

          



             Planned Activity Planned Date Details      Description  Data Source

(s)

 

                          Potassium Chloride 20 MEQ Extended Release Oral Tablet

 2021 12:00:00 AM 

EDT                                                         eCW1 ([facility])

 

                          Potassium Chloride 20 MEQ Extended Release Oral Tablet

 2021 12:00:00 AM 

EDT                                                         eCW1 ([facility])

 

                          Potassium Chloride 20 MEQ Extended Release Oral Tablet

 2021 12:00:00 AM 

EDT                                                         eCW1 ([facility])

 

                          Potassium Chloride 20 MEQ Extended Release Oral Tablet

 2021 12:00:00 AM 

EDT                                                         eCW1 ([facility])

 

                          Potassium Chloride 20 MEQ Extended Release Oral Tablet

 2021 12:00:00 AM 

EDT                                                         eCW1 ([facility])

 

                          Potassium Chloride 20 MEQ Extended Release Oral Tablet

 2021 12:00:00 AM 

EDT                                                         eCW1 ([facility])

 

             Ibuprofen 600 MG Oral Tablet 2021 12:00:00 AM EDT            

               eCW1 ([facility])

 

             Ibuprofen 600 MG Oral Tablet 2021 12:00:00 AM EDT            

               eCW1 ([facility])

 

             Ibuprofen 600 MG Oral Tablet 2021 12:00:00 AM EDT            

               eCW1 ([facility])

## 2021-10-22 NOTE — REP
INDICATION:

Fever.



COMPARISON:

07/27/2012.



TECHNIQUE:

Single portable AP view of the chest was performed.



FINDINGS:

There is no acute infiltrate or pulmonary edema.  Lungs are clear.  The heart is not

significantly enlarged.  The mediastinal silhouette is unremarkable.  The visualized

osseous structures are intact.



IMPRESSION:

No acute pulmonary disease.





<Electronically signed by Nomi Arana > 10/22/21 [id number]

## 2021-10-22 NOTE — REPVR
PROCEDURE INFORMATION: 

Exam: CT Head Without Contrast 

Exam date and time: 10/22/2021 5:06 PM 

Age: 76 years old 

Clinical indication: Altered mental status/memory loss; Additional info: AMS, 

lethargy 



TECHNIQUE: 

Imaging protocol: Computed tomography of the head without contrast. 

Radiation optimization: All CT scans at this facility use at least one of these 

dose optimization techniques: automated exposure control; mA and/or kV 

adjustment per patient size (includes targeted exams where dose is matched to 

clinical indication); or iterative reconstruction. 



COMPARISON: 

No relevant prior studies available. 



FINDINGS: 

Brain: Normal. No hemorrhage. There are mild periventricular and subcortical 

lucencies consistent with chronic microvascular ischemic changes.The gray-white 

differentiation is maintained. No hemorrhage. No edema.

Cerebral ventricles: No ventriculomegaly. 

Paranasal sinuses: Visualized sinuses are unremarkable. No fluid levels. 

Mastoid air cells: Visualized mastoid air cells are well aerated. 

Bones/joints: Unremarkable. No acute fracture. 

Soft tissues: Unremarkable. 



IMPRESSION: 

No acute intracranial abnormality.

Chronic microvascular ischemic changes.



Electronically signed by: Dany Zavaleta On 10/22/2021  17:50:04 PM

## 2021-10-22 NOTE — CCD
Summarization Of Episode

                             Created on: 10/22/2021



MOHSEN TAVARES

External Reference #: [id number]

: 1945

Sex: Undifferentiated



Demographics





                          Address                   [address] [place]  [address]

 

                          Home Phone                [phone]

 

                          Preferred Language        English

 

                          Marital Status            Unknown

 

                          Sikh Affiliation     Unknown

 

                          Race                      Unknown

 

                          Ethnic Group              Not  or 





Author





                          Author                    [facility]              [facility]

 

                          Address                   Unknown

 

                          Phone                     Unavailable







Support





                Name            Relationship    Address         Phone

 

                RE              Next Of Kin     Unknown         Unavailable

 

                RETIRED         Next Of Kin     Unknown         [phone]

 

                UE              Next Of Kin     Unknown         Unavailable

 

                    ANG NELSON       Next Of Kin         [address] [place]  [address]                 [phone]

 

                    ANG NELSON       ECON                [address] [place]  [address]                 [phone]



                                  



Re-disclosure Warning

          The records that you are about to access may contain information from 
federally-assisted alcohol or drug abuse programs. If such information is 
present, then the following federally mandated warning applies: This information
has been disclosed to you from records protected by federal confidentiality 
rules (42 CFR part 2). The federal rules prohibit you from making any further 
disclosure of this information unless further disclosure is expressly permitted 
by the written consent of the person to whom it pertains or as otherwise 
permitted by 42 CFR part 2. A general authorization for the release of medical 
or other information is NOT sufficient for this purpose. The Federal rules 
restrict any use of the information to criminally investigate or prosecute any 
alcohol or drug abuse patient.The records that you are about to access may 
contain highly sensitive health information, the redisclosure of which is 
protected by Article 27-F of the [facility] Public Health law. If you 
continue you may have access to information: Regarding HIV / AIDS; Provided by 
facilities licensed or operated by the [facility] Office of Mental Health; 
or Provided by the [facility] Office for People With Developmental 
Disabilities. If such information is present, then the following [place] 
mandated warning applies: This information has been disclosed to you from 
confidential records which are protected by state law. State law prohibits you 
from making any further disclosure of this information without the specific 
written consent of the person to whom it pertains, or as otherwise permitted by 
law. Any unauthorized further disclosure in violation of state law may result in
a fine or senior living sentence or both. A general authorization for the release of 
medical or other information is NOT sufficient authorization for further disc
losure.                                                                         
    



Encounters

          



           Encounter  Providers  Location   Date       Indications Data Source(s

)

 

                Unknown                         [address] [facility] [place] [address] 2021 12:00:00 AM 

EDT                                                 eCW1 [facility]

 

                Unknown                         [address] [facility]

[place] [address] 2021 12:00:00 AM 

EDT                                                 eCW1 ([facility]

[facility])

 

                Unknown                         [address] [facility]

[place] [address] 2021 12:00:00 AM 

EDT                                                 eCW1 ([facility]

[facility])

 

                Unknown                         [address] [facility] [place]

[place] [address] 2021 12:00:00 AM 

EDT                                                 eCW1 ([facility]

[facility])

 

                Unknown                         [address] [facility] [place]

[place] [address] 2021 12:00:00 AM 

EDT                                                 [facility]

 

                Unknown                         [address] [facility] [place]

[place] [address] 2021 12:00:00 AM 

EDT                                                 eCW1 ([facility]

 

                Outpatient                      [address] [facility] [place]

[place] [address] 2021 12:00:00 AM

EDT                                                 eCW1 ([facility]

[facility])

 

                Unknown                         [address] [facility] [place]

[place] [address] 2021 12:00:00 AM 

EDT                                                 eCW1 ([facility])

 

                Outpatient                      [address] [facility] [place]

[place] [address] 05/10/2021 12:00:00 AM

EDT                                                 eCW1 ([facility]

[facility])

 

                Unknown                         [address] [facility] [place]

[place] [address] 2021 12:00:00 AM 

EDT                                                 eCW1 ([facility]

[facility])

 

                Unknown                         [address] [facility] [place]

[place] [address] 2021 12:00:00 AM 

EDT                                                 eCW1 ([facility]

 

                Unknown                         [address] [facility] [place]

[place] [address] 2021 12:00:00 AM 

EDT                                                 eCW1 ([facility]

 

                Unknown                         [address] [facility] [place]

[place] [address] 2021 12:00:00 AM 

EDT                                                 eCW1 ([facility]

 

                Unknown                         [address] [facility] [place]

[place] [address] 2021 12:00:00 AM 

EST                                                 eCW1 ([facility])

 

                Unknown                         [address] [facility] [place]

[place] [address] 2021 12:00:00 AM 

EST                                                 eCW1 ([facility])

 

                Unknown                         [address] [facility] [place]

[place] [address] 2021 12:00:00 AM 

EST                                                 eCW1 ([facility]

 

                Outpatient                      [address] [facility] [place]

[place] [address] 2021 12:00:00 AM

EST                                                 eCW1 ([facility]

 

                Unknown                         [address] [facility] [place]

[place] [address] 2021 12:00:00 AM 

EST                                                 eCW1 ([facility]

 

                Unknown                         [address] [facility] [place]

[place] [address] 2020 12:00:00 AM 

EST                                                 eCW1 ([facility])

 

                Unknown                         [address] [facility] [place]

[place] [address] 2020 12:00:00 AM 

EST                                                 eCW1 ([facility])

 

                Unknown                         [address] [facility] [place]

[place] [address] 2020 12:00:00 AM 

EST                                                 eCW1 ([facility]

 

                Unknown                         [address] [facility] [place]

[place] [address] 2020 12:00:00 AM 

EST                                                 eCW1 ([facility]

 

                Unknown                         [address] [facility]

[place] [address] 2020 12:00:00 AM 

EST                                                 eCW1 ([facility])

 

                Unknown                         [address] [facility] [place]

[place] [address] 2020 12:00:00 AM 

EST                                                 eCW1 ([facility])

 

                Unknown                         [address] [facility] [place]

[place] [address] 2020 12:00:00 AM 

EST                                                 eCW1 ([facility])

 

                Unknown                         [address] [facility] [place]

[place] [address] 2020 12:00:00 AM 

EST                                                 eCW1 ([facility])

 

                Unknown                         [address] [facility] [place]

[place] [address] 2020 12:00:00 AM 

EST                                                 eCW1 ([facility])

 

                Unknown                         [address] [facility] [place]

[place] [address] 2020 12:00:00 AM 

EST                                                 eCW1 ([facility])

 

                Unknown                         [address] [facility] [place]

[place] [address] 10/30/2020 12:00:00 AM 

EDT                                                 eCW1 ([facility])

 

                Unknown                         [address] [facility] [place]

[place] [address] 2020 12:00:00 AM 

EDT                                                 eCW1 ([facility])



                                                                                
                                                                                
                                                                                
                                                                                
                                                      



Immunizations

          



             Vaccine      Date         Status       Description  Data Source(s)

 

                                        J&J/Ezequiel COVID- 19 (given elsewhere) 

SARS-COV-2 vaccine, vector non-

replicating, recombinant spike protein-Ad26, preservative free, 0.5 mL 

2021 02:37:00 PM EDT completed                               eCW1 ([facility])

 

                                        J&J/Ezequiel COVID- 19 (given elsewhere) 

SARS-COV-2 vaccine, vector non-

replicating, recombinant spike protein-Ad26, preservative free, 0.5 mL 

2021 02:37:00 PM EDT completed                               eCW1 ([facility])

 

                                        J&J/Ezequiel COVID- 19 (given elsewhere) 

SARS-COV-2 vaccine, vector non-

replicating, recombinant spike protein-Ad26, preservative free, 0.5 mL 

2021 02:37:00 PM EDT completed                               eCW1 ([facility])

 

                                        &J/Ezequiel COVID- 19 (given elsewhere) 

SARS-COV-2 vaccine, vector non-

replicating, recombinant spike protein-Ad26, preservative free, 0.5 mL 

2021 02:37:00 PM EDT completed                               eCW1 ([facility])

 

                                        J&J/Ezequiel COVID- 19 (given elsewhere) 

SARS-COV-2 vaccine, vector non-

replicating, recombinant spike protein-Ad26, preservative free, 0.5 mL 

2021 02:37:00 PM EDT completed                               eCW1 ([facility])

 

                                        &/[facility] COVID- 19 (given elsewhere) 

SARS-COV-2 vaccine, vector non-

replicating, recombinant spike protein-Ad26, preservative free, 0.5 mL 

2021 02:37:00 PM EDT completed                               eCW1 ([facility])

 

                                        J&J/Ezequiel COVID- 19 (given elsewhere) 

SARS-COV-2 vaccine, vector non-

replicating, recombinant spike protein-Ad26, preservative free, 0.5 mL 

2021 02:37:00 PM EDT completed                               eCW1 ([facility])

 

                                        J&J/Ezequiel COVID- 19 (given elsewhere) 

SARS-COV-2 vaccine, vector non-

replicating, recombinant spike protein-Ad26, preservative free, 0.5 mL 

2021 02:37:00 PM EDT completed                               eCW1 ([facility])

 

             COVID-19 VACCINE Ezequiel 2021 12:00:00 AM EDT completed      

           [facility]

 

                                        Vaccine Series Complete: YESThis Data wa

s Submitted to [facility] Via [employer] 



                                                                                
                                                                                
      



Medications

          



          Medication Brand Name Start Date Product Form Dose      Route     Admi

nistrative 

Instructions Pharmacy Instructions Status     Indications Reaction   Description

 Data 

Source(s)

 

                                        Potassium Chloride 20 MEQ Extended Relea

se Oral Tablet Potassium Chloride ER 20 

MEQ             Potassium Chloride ER 20 MEQ 2021 12:00:00 AM EDT         

        1.0 

{tablet_with_food}                         active                  Potassium Chl

oride ER 20 MEQ eCW1 ([facility])

 

                                        Potassium Chloride 20 MEQ Extended Relea

se Oral Tablet Potassium Chloride ER 20 

MEQ             Potassium Chloride ER 20 MEQ 2021 12:00:00 AM EDT         

        1.0 

{tablet_with_food}                         active                  Potassium Chl

oride ER 20 MEQ eCW1 ([facility])

 

                                        Potassium Chloride 20 MEQ Extended Relea

se Oral Tablet Potassium Chloride ER 20 

MEQ             Potassium Chloride ER 20 MEQ 2021 12:00:00 AM EDT         

        1.0 

{tablet_with_food}                         active                  Potassium Chl

oride ER 20 MEQ eCW1 ([facility])

 

                                        Potassium Chloride 20 MEQ Extended Relea

se Oral Tablet Potassium Chloride ER 20 

MEQ             Potassium Chloride ER 20 MEQ 2021 12:00:00 AM EDT         

        1.0 

{tablet_with_food}                         active                  Potassium Chl

oride ER 20 MEQ eCW1 ([facility])

 

                                        Potassium Chloride 20 MEQ Extended Relea

se Oral Tablet Potassium Chloride ER 20 

MEQ             Potassium Chloride ER 20 MEQ 2021 12:00:00 AM EDT         

        1.0 

{tablet_with_food}                         active                  Potassium Chl

oride ER 20 MEQ eCW1 ([facility])

 

                                        Potassium Chloride 20 MEQ Extended Relea

se Oral Tablet Potassium Chloride ER 20 

MEQ             Potassium Chloride ER 20 MEQ 2021 12:00:00 AM EDT         

        1.0 

{tablet_with_food}                         active                  Potassium Chl

oride ER 20 MEQ eCW1 ([facility])

 

          Ibuprofen 600 MG Oral Tablet Ibuprofen 600 MG 2021 12:00:00 AM E

DT                                

                      active                           Ibuprofen 600 MG eCW1 ([facility])

 

          Ibuprofen 600 MG Oral Tablet Ibuprofen 600 MG 2021 12:00:00 AM E

DT                                

                      active                           Ibuprofen 600 MG eCW1 ([facility])

 

          Ibuprofen 600 MG Oral Tablet Ibuprofen 600 MG 2021 12:00:00 AM E

DT                                

                      active                           Ibuprofen 600 MG eCW1 ([facility])

 

          Ibuprofen 600 MG Oral Tablet Ibuprofen 600 MG 2021 12:00:00 AM E

DT                                

                      active                           Ibuprofen 600 MG eCW1 ([facility])

 

          Ibuprofen 600 MG Oral Tablet Ibuprofen 600 MG 2021 12:00:00 AM E

DT                                

                      active                           Ibuprofen 600 MG eCW1 ([facility])

 

          Ibuprofen 600 MG Oral Tablet Ibuprofen 600 MG 2021 12:00:00 AM E

DT                                

                      active                           Ibuprofen 600 MG eCW1 ([facility])

 

          Ibuprofen 600 MG Oral Tablet Ibuprofen 600 MG 2021 12:00:00 AM E

DT                                

                      active                           Ibuprofen 600 MG eCW1 ([facility])

 

          Ibuprofen 600 MG Oral Tablet Ibuprofen 600 MG 2021 12:00:00 AM E

DT                                

                      active                           Ibuprofen 600 MG eCW1 ([facility])

 

          Ibuprofen 600 MG Oral Tablet Ibuprofen 600 MG 2021 12:00:00 AM E

DT                                

                      active                           Ibuprofen 600 MG eCW1 ([facility])

 

          Ibuprofen 600 MG Oral Tablet Ibuprofen 600 MG 2021 12:00:00 AM E

DT                                

                      active                           Ibuprofen 600 MG eCW1 ([facility])

 

          Ibuprofen 600 MG Oral Tablet Ibuprofen 600 MG 2021 12:00:00 AM E

DT                                

                      active                           Ibuprofen 600 MG eCW1 ([facility])



                                                                                
                                                                                
                                                                  



Insurance Providers

          



             Payer name   Policy type / Coverage type Policy ID    Covered party

 ID Covered 

party's relationship to morales Policy Morales             Plan Information

 

          MEDICARE BLUE PPO      306           [id number]           SP        

          [id number]

 

          MEDICARE BLUE PPO      306           [id number]           SP        

          [id number]

 

          [facility]         [id number] S                   VYM

[id number]

 

                    ANSI-Medicare Part B [id number]   

                            

[id number]

 

                    ANSI-Medicare Part B [id number]   

                            

[id number]

 

                    ANSI-Medicare Part B [id number]   

                            

[id number]

 

          MEDICARE BLUE PPO      306           [id number]           SP        

          [id number]

 

          [employer] P         [id number] S                   VYM

[id number]

 

          MEDICARE BLUE PPO P         [id number] S                  

 [id number]

 

          MEDICARE BLUE PPO P         [id number] S                  

 [id number]

 

                              CLM#[id number]                               CLM

#[id number]



                                                                                
                                                                                
                          



Problems, Conditions, and Diagnoses

          No Information                                                        
                               



Surgeries/Procedures

          No Information                                                        
                     



Results

          



                    ID                  Date                Data Source

 

                    Basic Metabolic Profile (BMP) 2021 12:00:00 AM EDT eCW

1 ([facility])









          Name      Value     Range     Interpretation Code Description Data Stephanie

rce(s) Supporting 

Document(s)

 

                    0.53      0.55-1.30           CREATININE FOR GFR eCW1 ([facility])  

 

                    101                     GLUCOSE, FASTING eCW1 ([facility])  

 

                    11        7-18                BLOOD UREA NITROGEN eCW1 ([facility])  

 

                    > 60.0    >39                 GLOMERULAR FILTRATION RATE eCW

1 ([facility]) 



 

                    133       136-145             SODIUM LEVEL eCW1 ([facility])  

 

                    3.7       3.5-5.1             POTASSIUM SERUM eCW1 ([facility])  

 

                    96                      CHLORIDE LEVEL eCW1 ([facility])  

 

                    10.0      8.8-10.2            CALCIUM LEVEL eCW1 ([facility])  

 

                    32        21-32               CARBON DIOXIDE LEVEL eCW1 ([facility])  









                    ID                  Date                Data Source

 

                    LIPID PANEL (CARDIAC RISK) 2021 12:00:00 AM EDT eCW1 (

[facility])









          Name      Value     Range     Interpretation Code Description Data Stephanie

rce(s) Supporting 

Document(s)

 

             Triglyceride [Mass/volume] in Serum or Plasma by calculation 59    

       <150                      

TRIGLYCERIDES LEVEL       eCW1 ([facility])  

 

             Cholesterol in LDL [Mass/volume] in Serum or Plasma by calculation 

60           <100                      LDL

 CHOLESTEROL              eCW1 ([facility])  

 

                    72                            NON-HDL-C eCW1 ([facility])  

 

           Cholesterol [Moles/volume] in Serum or Plasma 161        <200        

          CHOLESTEROL LEVEL eCW1 

([facility])         

 

           Cholesterol in HDL [Moles/volume] in Serum or Plasma 89         >40  

                 HDL CHOLESTEROL 

eCW1 ([facility])    

 

                    1.808     <5                  CHOLESTEROL RISK RATIO eCW1 ([facility])  









                    ID                  Date                Data Source

 

                    [id number]              05/10/2021 12:00:00 AM EDT eCW1 ([facility])









          Name      Value     Range     Interpretation Code Description Data Stephanie

rce(s) Supporting 

Document(s)

 

           Albumin/Creatinine [Mass Ratio] in Urine 5.4                         

     MALB URINE SIEMENS eCW1 

([facility])         

 

           Microalbumin/Creatinine [Mass Ratio] in Urine 44.4                   

          CREATININE, URINE eCW1 

([facility])         

 

           Microalbumin/Creatinine [Ratio] in Urine 12.1       0.0-30.0         

     TIM/CREAT RATIO eCW1 

([facility])         









                    ID                  Date                Data Source

 

                    [id number]              05/10/2021 12:00:00 AM EDT eCW1 ([facility])









          Name      Value     Range     Interpretation Code Description Data Stephanie

rce(s) Supporting 

Document(s)

 

           Hemoglobin A1c/Hemoglobin.total in Blood 6.1                         

     HEMOGLOBIN A1c eCW1 ([facility])                    







                                        Procedure

 

                                          



                                                                                
                                                



Social History

          



           Code       Duration   Value      Status     Description Data Source(s

)

 

           Smoking    2021 12:00:00 AM EDT Never Smoker completed  Never S

moker eCW1 

([facility])

 

           Smoking    2021 12:00:00 AM EDT Never Smoker completed  Never S

moker eCW1 

([facility])

 

           Smoking    2021 12:00:00 AM EDT Never Smoker completed  Never S

moker eCW1 

([facility])

 

           Smoking    2021 12:00:00 AM EDT Never Smoker completed  Never S

moker eCW1 

([facility])

 

           Smoking    2021 12:00:00 AM EDT Never Smoker completed  Never S

moker eCW1 

([facility])

 

           Smoking    2021 12:00:00 AM EDT Never Smoker completed  Never S

moker eCW1 

([facility])

 

           Smoking    2021 12:00:00 AM EDT Never Smoker completed  Never S

moker eCW1 

([facility])

 

           Smoking    2021 12:00:00 AM EDT Never Smoker completed  Never S

moker eCW1 

([facility])

 

           Smoking    05/10/2021 12:00:00 AM EDT Never Smoker completed  Never S

moker eCW1 

([facility])

 

           Smoking    2021 12:00:00 AM EDT Never Smoker completed  Never S

moker eCW1 

([facility])

 

           Smoking    2021 12:00:00 AM EDT Never Smoker completed  Never S

moker eCW1 

([facility])

 

           Smoking    2021 12:00:00 AM EDT Never Smoker completed  Never S

moker eCW1 

([facility])

 

           Smoking    2021 12:00:00 AM EDT Never Smoker completed  Never S

moker eCW1 

([facility])

 

           Smoking    2021 12:00:00 AM EDT Never Smoker completed  Never S

moker eCW1 

([facility])

 

           Smoking    2021 12:00:00 AM EST Never Smoker completed  Never S

moker eCW1 

([facility])

 

           Smoking    2021 12:00:00 AM EST Never Smoker completed  Never S

moker eCW1 

([facility])

 

           Smoking    2021 12:00:00 AM EST Never Smoker completed  Never S

moker eCW1 

([facility])



                                                                                
                                                                                
                                                                                
                  



Vital Signs

          



                    ID                  Date                Data Source

 

                    UNK                                      









           Name       Value      Range      Interpretation Code Description Data

 Source(s)

 

           Body weight 133.0 [lb_av]                       133.0 [lb_av] eCW1 ([facility])

 

           Systolic blood pressure 136 mm[Hg]                       136 mm[Hg] e

CW1 ([facility])

 

           Body height 62 [in_i]                        62 [in_i]  eCW1 ([facility])

 

           Body mass index (BMI) [Ratio] 24.32 kg/m2                       24.32

 kg/m2 W1 ([facility])

 

           Heart rate 94 /min                          94 /min    eCW1 ([facility])

 

           Respiratory rate 18 /min                          18 /min    eCW1 ([facility])

 

           Body temperature 97.9 [degF]                       97.9 [degF] eCW1 (

[facility])

 

           Diastolic blood pressure 80 mm[Hg]                        80 mm[Hg]  

eCW1 ([facility])

 

           Body weight 136.8 [lb_av]                       136.8 [lb_av] eCW1 ([facility])

 

           Body height 62 [in_i]                        62 [in_i]  eCW1 ([facility])

 

           Body mass index (BMI) [Ratio] 25.02 kg/m2                       25.02

 kg/m2 eCW1 ([facility])

 

           Heart rate 88 /min                          88 /min    eCW1 ([facility])

 

           Respiratory rate 18 /min                          18 /min    eCW1 ([facility])

 

           Body temperature 99 [degF]                        99 [degF]  eCW1 ([facility])

 

           Systolic blood pressure 132 mm[Hg]                       132 mm[Hg] e

CW1 ([facility])

 

           Diastolic blood pressure 82 mm[Hg]                        82 mm[Hg]  

eCW1 ([facility])

 

           Body weight 138 [lb_av]                       138 [lb_av] eCW1 ([facility])

 

           Body height 62 [in_i]                        62 [in_i]  eCW1 ([facility])

 

           Body mass index (BMI) [Ratio] 25.24 kg/m2                       25.24

 kg/m2 eCW1 ([facility])

 

           Heart rate 98 /min                          98 /min    eCW1 ([facility])

 

           Respiratory rate 18 /min                          18 /min    eCW1 ([facility])

 

           Body temperature 98.3 [degF]                       98.3 [degF] eCW1 (

[facility])

 

           Systolic blood pressure 136 mm[Hg]                       136 mm[Hg] e

CW1 ([facility])

 

           Diastolic blood pressure 80 mm[Hg]                        80 mm[Hg]  

eCW1 ([facility])



                                                                                
                  



Patient Treatment Plan of Care

          



             Planned Activity Planned Date Details      Description  Data Source

(s)

 

                          Potassium Chloride 20 MEQ Extended Release Oral Tablet

 2021 12:00:00 AM 

EDT                                                         eCW1 ([facility])

 

                          Potassium Chloride 20 MEQ Extended Release Oral Tablet

 2021 12:00:00 AM 

EDT                                                         eCW1 ([facility])

 

                          Potassium Chloride 20 MEQ Extended Release Oral Tablet

 2021 12:00:00 AM 

EDT                                                         eCW1 ([facility])

 

                          Potassium Chloride 20 MEQ Extended Release Oral Tablet

 2021 12:00:00 AM 

EDT                                                         eCW1 ([facility])

 

                          Potassium Chloride 20 MEQ Extended Release Oral Tablet

 2021 12:00:00 AM 

EDT                                                         eCW1 ([facility])

 

                          Potassium Chloride 20 MEQ Extended Release Oral Tablet

 2021 12:00:00 AM 

EDT                                                         eCW1 ([facility])

 

             Ibuprofen 600 MG Oral Tablet 2021 12:00:00 AM EDT            

               eCW1 ([facility])

 

             Ibuprofen 600 MG Oral Tablet 2021 12:00:00 AM EDT            

               eCW1 ([facility])

 

             Ibuprofen 600 MG Oral Tablet 2021 12:00:00 AM EDT            

               eCW1 ([facility])

## 2021-10-22 NOTE — HPEPDOC
General


Date of Admission


Oct 22, 2021 at 13:04


Date of Service:  Oct 22, 2021


Chief Complaint


The patient is a 76-year-old female admitted with a reason for visit of Chronic 

Radicular Lumbar Pain, Intractable Low Kate.





History of Present Illness


Mrs. Chaudhary is a 76-year-old female with static and diabetes mellitus type 2 

who presents with intractable back pain.  Patient was given tramadol in the ED 

and she is been very lethargic since then.  When I saw her in the morning, she 

cannot answer any questions.  She was moaning.  When I attempted straight leg 

raise, she immediately responded with pain.  I called patient's daughter for 

more information.  She does note she currently has IT band tightness in her hip 

frequently bothers her.  Wednesday night, she woke up with pain which 

progressively worsened.  She had to pick her mother up to sit in the wheelchair.

 Denies any recent falls or trauma.  She tells me at baseline, the patient 

cognition is pretty good.  While in the ED, CT of the lumbosacral spine was 

obtained which demonstrated severe central spinal canal stenosis.  MRI was obta

ined and did not demonstrate cauda equina.  Ultrasound of the bladder did not 

demonstrate any urinary retention.  Patient will be admitted for intractable 

pain.





Home Medications


Scheduled


Ascorbate Calcium/Bioflavonoid (Lia-C 1,000 mg Tablet) 1 Each Tablet, 1 EACH 

PO QPM, (Reported)


   DINNERTIME 


Aspirin (Aspirin EC) 81 Mg Tablet.dr, 81 MG PO DAILY, (Reported)


Atorvastatin Calcium (Atorvastatin Calcium) 80 Mg Tablet, 80 MG PO QHS, 

(Reported)


B2/Vits A,C,E/Lut/Zeaxanth/Min (Icaps Tablet) 1 Each Tablet.er, 1 TAB PO QPM, 

(Reported)


   DINNERTIME 


Miguel A/D3/Mag11/Zinc//Vick/Bor (Caltrate 600+D Plus Tablet) 1 Each Tablet, 1 TAB

PO QPM, (Reported)


   DINNERTIME 


Chlorthalidone (Chlorthalidone) 25 Mg Tablet, 25 MG PO DAILY, (Reported)


Cyanocobalamin (Vitamin B-12) (Vitamin B-12) 1,000 Mcg Tablet, 1,000 MCG PO QPM,

(Reported)


   DINNERTIME 


Dulaglutide (Trulicity) 0.75 Mg/0.5 Ml Pen.injctr, 0.75 MG SQ QWEEK, (Reported)


    


Levothyroxine Sodium (Synthroid) 75 Mcg Tablet, 75 MCG PO DAILY, (Reported)


Loratadine (Loratadine) 10 Mg Tablet, 10 MG PO DAILY, (Reported)


Meclizine HCl (Meclizine HCl) 25 Mg Tablet, 25 MG PO TID, (Reported)


   MORNING, SUPPER, NIGHT 


Metformin HCl (Metformin HCl) 1,000 Mg Tablet, 1,000 MG PO BID, (Reported)


Multivitamins (Thera M Plus Tablet) 1 Each Tablet, 1 TAB PO QPM, (Reported)


   DINNERTIME 


Omega-3 Fatty Acids/Fish Oil (Omega 3 1,000 mg Softgel) 1 Each Capsule, 1 CAP PO

QPM, (Reported)


   DINNERTIME 


Omeprazole (Omeprazole) 20 Mg Capsule.dr, 20 MG PO DAILY, (Reported)


Phenazopyridine HCl (Azo Urinary Pain Relief) 95 Mg Tablet, 95 MG PO QPM, 

(Reported)


   DINNERTIME 


Potassium Chloride (Potassium Chloride) 10 Meq Tab.er.prt, 10 MEQ PO DAILY, 

(Reported)





Scheduled PRN


Baclofen (Baclofen) 10 Mg Tablet, 10 MG PO TID PRN for MUSCLE SPASMS, (Reported)


Ibuprofen (Ibuprofen) 600 Mg Tablet, 600 MG PO TID PRN for PAIN LEVEL 1-5, 

(Reported)





Allergies


Coded Allergies:  


     Penicillins (Verified  Allergy, Mild, rash, 19)


     Sulfa (Sulfonamide Antibiotics) (Verified  Allergy, Mild, rash, 19)


     codeine (Verified  Allergy, Mild, rash, 19)


     erythromycin base (Verified  Allergy, Mild, rash, 19)


     morphine (Verified  Allergy, Mild, rash, 19)





Past Medical History


Medical History


1.  GERD


2.  Hypertension


3.  Sciatica


4.  Type 2 diabetes mellitus


5.  Received the [employer] Covid vaccine on 2021


Surgical History


Per chart review, denies surgery





Family History


Father:  at the age of 76 years old, history of flu


Mother:  at the age of 85 years old, history of stroke





Social History


* Smoker:  Denies





A-FIB/CHADSVASC


A-FIB History


Current/History of A-Fib/PAF?:  No





Review of Systems


Other systems


Unable to obtain as patient is lethargic





Physical Examination


General Exam:  Negative: Alert, Cooperative


Eye Exam:  Negative: Sclera icteric


Chest Exam:  Positive: Clear to auscultation; 


   Negative: Wheezing


Heart Exam:  Positive: Tachycardic, Regular Rhythm


Abdomen Exam:  Positive: Normal bowel sounds, Soft


Extremity Exam:  Positive: Edema


Neuro Exam:  Positive: Other (Patient had immediate pain bilateral with straight

leg raise )


Psych Exam:  Positive: Other (Unable to obtain as she was lethargic); 


   Negative: Mental status NL





Vital Signs





Vital Signs








  Date Time  Temp Pulse Resp B/P (MAP) Pulse Ox O2 Delivery O2 Flow Rate FiO2


 


10/22/21 13:30  120 20 162/89 (113) 96 Room Air  


 


10/22/21 12:30 97.3       











Laboratory Data


Labs 24H


Laboratory Tests 2


10/22/21 05:29: 


Nucleated Red Blood Cells % (auto) 0.0, Anion Gap 15, Glomerular Filtration Rate

50.7, Calcium Level 10.9H, Coronavirus (COVID-19)(PCR) NEGATIVE, Influenza Type 

A (RT-PCR) NEGATIVE, Influenza Type B (RT-PCR) NEGATIVE, Respiratory Syncytial 

Virus (PCR) NEGATIVE


CBC/BMP


Laboratory Tests


10/22/21 05:29











 Assessment/Plan


Mrs. Chaudhary is a 76-year-old female with static and diabetes mellitus type 2 

who presents with intractable back pain.  We will work on pain control with 

acetaminophen, tramadol, and ketorolac.  We will also order PT and OT to work 

with patient for her spinal canal stenosis.





Plan / VTE


VTE Prophylaxis Ordered?:  Yes





Plan


Plan


1.  Intractable pain


Secondary to spinal canal stenosis


We will work on pain control with acetaminophen, tramadol, and ketorolac


Continue baclofen


PT and OT ordered





2.  Weakness


Secondary to spinal canal stenosis


Pain control and PT/OT





3.  Hypertension


Continue chlorthalidone 





4.  Diabetes mellitus


Carbohydrate consistent diet


Sliding scale insulin





5.  Hypothyroidism


Continue levothyroxine





6.  Seasonal allergies


Continue loratadine





7.  GERD


Continue omeprazole





8.  DVT prophylaxis


Lovenox





Disposition: Pending clinical improvement and pain control











JAYY SANTACRUZ DO               Oct 22, 2021 16:09

## 2021-10-23 VITALS — DIASTOLIC BLOOD PRESSURE: 68 MMHG | SYSTOLIC BLOOD PRESSURE: 114 MMHG

## 2021-10-23 LAB
B-OH-BUTYR SERPL-MCNC: 21.78 MG/DL (ref ?–2.81)
BASE EXCESS BLDA CALC-SCNC: -18.1 MMOL/L (ref -2–2)
BASE EXCESS BLDA CALC-SCNC: -19.5 MMOL/L (ref -2–2)
BASE EXCESS BLDV CALC-SCNC: -10.1 MMOL/L (ref -2–2)
BASE EXCESS BLDV CALC-SCNC: -7.7 MMOL/L (ref -2–2)
BASE EXCESS BLDV CALC-SCNC: -8.8 MMOL/L (ref -2–2)
BUN SERPL-MCNC: 27 MG/DL (ref 7–18)
BUN SERPL-MCNC: 29 MG/DL (ref 7–18)
BUN SERPL-MCNC: 31 MG/DL (ref 7–18)
BUN SERPL-MCNC: 32 MG/DL (ref 7–18)
CALCIUM SERPL-MCNC: 7.8 MG/DL (ref 8.8–10.2)
CALCIUM SERPL-MCNC: 7.9 MG/DL (ref 8.8–10.2)
CALCIUM SERPL-MCNC: 8 MG/DL (ref 8.8–10.2)
CALCIUM SERPL-MCNC: 8.7 MG/DL (ref 8.8–10.2)
CHLORIDE SERPL-SCNC: 109 MEQ/L (ref 98–107)
CHLORIDE SERPL-SCNC: 110 MEQ/L (ref 98–107)
CHLORIDE SERPL-SCNC: 113 MEQ/L (ref 98–107)
CHLORIDE SERPL-SCNC: 115 MEQ/L (ref 98–107)
CO2 BLDA CALC-SCNC: 11.4 MEQ/L (ref 23–31)
CO2 BLDA CALC-SCNC: 8.7 MEQ/L (ref 23–31)
CO2 BLDV CALC-SCNC: 13.5 MEQ/L (ref 24–28)
CO2 BLDV CALC-SCNC: 13.8 MEQ/L (ref 24–28)
CO2 BLDV CALC-SCNC: 15.5 MEQ/L (ref 24–28)
CO2 SERPL-SCNC: 10 MEQ/L (ref 21–32)
CO2 SERPL-SCNC: 15 MEQ/L (ref 21–32)
CO2 SERPL-SCNC: 16 MEQ/L (ref 21–32)
CO2 SERPL-SCNC: 17 MEQ/L (ref 21–32)
CREAT SERPL-MCNC: 0.85 MG/DL (ref 0.55–1.3)
CREAT SERPL-MCNC: 0.86 MG/DL (ref 0.55–1.3)
CREAT SERPL-MCNC: 0.96 MG/DL (ref 0.55–1.3)
CREAT SERPL-MCNC: 1.05 MG/DL (ref 0.55–1.3)
EST. AVERAGE GLUCOSE BLD GHB EST-MCNC: 131 MG/DL (ref 60–110)
GFR SERPL CREATININE-BSD FRML MDRD: 54.1 ML/MIN/{1.73_M2} (ref 39–?)
GFR SERPL CREATININE-BSD FRML MDRD: 60 ML/MIN/{1.73_M2} (ref 39–?)
GFR SERPL CREATININE-BSD FRML MDRD: > 60 ML/MIN/{1.73_M2} (ref 39–?)
GFR SERPL CREATININE-BSD FRML MDRD: > 60 ML/MIN/{1.73_M2} (ref 39–?)
GLUCOSE SERPL-MCNC: 171 MG/DL (ref 70–100)
GLUCOSE SERPL-MCNC: 230 MG/DL (ref 70–100)
GLUCOSE SERPL-MCNC: 239 MG/DL (ref 70–100)
GLUCOSE SERPL-MCNC: 281 MG/DL (ref 70–100)
HCO3 BLDA-SCNC: 10.3 MEQ/L (ref 22–26)
HCO3 BLDA-SCNC: 8.1 MEQ/L (ref 22–26)
HCO3 BLDV-SCNC: 12.8 MEQ/L (ref 23–27)
HCO3 BLDV-SCNC: 13.2 MEQ/L (ref 23–27)
HCO3 BLDV-SCNC: 14.7 MEQ/L (ref 23–27)
HCO3 STD BLDA-SCNC: 11.1 MEQ/L (ref 22–26)
HCO3 STD BLDA-SCNC: 9.1 MEQ/L (ref 22–26)
HCO3 STD BLDV-SCNC: 16.5 MEQ/L
HCO3 STD BLDV-SCNC: 17.4 MEQ/L
HCO3 STD BLDV-SCNC: 18.3 MEQ/L
HCT VFR BLD AUTO: 36.7 % (ref 36–47)
HGB BLD-MCNC: 11.7 G/DL (ref 12–15.5)
MCH RBC QN AUTO: 27.3 PG (ref 27–33)
MCHC RBC AUTO-ENTMCNC: 31.9 G/DL (ref 32–36.5)
MCV RBC AUTO: 85.5 FL (ref 80–96)
OSMOLALITY SERPL: 299 MOSM/KG (ref 280–301)
OSMOLALITY SERPL: 305 MOSM/KG (ref 280–301)
PCO2 BLDA: 21.3 MMHG (ref 35–45)
PCO2 BLDA: 38.3 MMHG (ref 35–45)
PCO2 BLDV: 17.9 MMHG (ref 38–50)
PCO2 BLDV: 21.8 MMHG (ref 38–50)
PCO2 BLDV: 25.5 MMHG (ref 38–50)
PH BLDA: 7.05 UNITS (ref 7.35–7.45)
PH BLDA: 7.2 UNITS (ref 7.35–7.45)
PH BLDV: 7.38 UNITS (ref 7.33–7.43)
PH BLDV: 7.39 UNITS (ref 7.33–7.43)
PH BLDV: 7.49 UNITS (ref 7.33–7.43)
PHOSPHATE SERPL-MCNC: 1.3 MG/DL (ref 2.5–4.9)
PHOSPHATE SERPL-MCNC: 2 MG/DL (ref 2.5–4.9)
PHOSPHATE SERPL-MCNC: 3.2 MG/DL (ref 2.5–4.9)
PHOSPHATE SERPL-MCNC: 6.1 MG/DL (ref 2.5–4.9)
PLATELET # BLD AUTO: 191 10^3/UL (ref 150–450)
PO2 BLDA: 23.3 MMHG (ref 75–100)
PO2 BLDA: 88.7 MMHG (ref 75–100)
PO2 BLDV: 110.2 MMHG (ref 30–50)
PO2 BLDV: 134.9 MMHG (ref 30–50)
PO2 BLDV: 154.3 MMHG (ref 30–50)
POTASSIUM SERPL-SCNC: 3.1 MEQ/L (ref 3.5–5.1)
POTASSIUM SERPL-SCNC: 3.3 MEQ/L (ref 3.5–5.1)
POTASSIUM SERPL-SCNC: 3.5 MEQ/L (ref 3.5–5.1)
POTASSIUM SERPL-SCNC: 3.6 MEQ/L (ref 3.5–5.1)
RBC # BLD AUTO: 4.29 10^6/UL (ref 4–5.4)
SAO2 % BLDA: 19.7 % (ref 95–99)
SAO2 % BLDA: 93.4 % (ref 95–99)
SAO2 % BLDV: 98.1 % (ref 60–80)
SAO2 % BLDV: 98.3 % (ref 60–80)
SAO2 % BLDV: 99 % (ref 60–80)
SODIUM SERPL-SCNC: 141 MEQ/L (ref 136–145)
SODIUM SERPL-SCNC: 142 MEQ/L (ref 136–145)
SODIUM SERPL-SCNC: 143 MEQ/L (ref 136–145)
SODIUM SERPL-SCNC: 145 MEQ/L (ref 136–145)
WBC # BLD AUTO: 8.9 10^3/UL (ref 4–10)

## 2021-10-23 RX ADMIN — POTASSIUM CHLORIDE SCH MLS/HR: 7.46 INJECTION, SOLUTION INTRAVENOUS at 04:45

## 2021-10-23 RX ADMIN — INSULIN LISPRO SCH UNITS: 100 INJECTION, SOLUTION INTRAVENOUS; SUBCUTANEOUS at 07:30

## 2021-10-23 RX ADMIN — DEXTROSE MONOHYDRATE SCH MLS/HR: 50 INJECTION, SOLUTION INTRAVENOUS at 23:10

## 2021-10-23 RX ADMIN — INSULIN LISPRO SCH UNITS: 100 INJECTION, SOLUTION INTRAVENOUS; SUBCUTANEOUS at 03:42

## 2021-10-23 RX ADMIN — MECLIZINE HYDROCHLORIDE SCH MG: 25 TABLET ORAL at 03:43

## 2021-10-23 RX ADMIN — POTASSIUM CHLORIDE SCH MLS/HR: 7.46 INJECTION, SOLUTION INTRAVENOUS at 04:47

## 2021-10-23 RX ADMIN — POTASSIUM CHLORIDE SCH MLS/HR: 7.46 INJECTION, SOLUTION INTRAVENOUS at 04:46

## 2021-10-23 RX ADMIN — POTASSIUM CHLORIDE, DEXTROSE MONOHYDRATE AND SODIUM CHLORIDE SCH MLS/HR: 300; 5; 450 INJECTION, SOLUTION INTRAVENOUS at 11:40

## 2021-10-23 RX ADMIN — DEXTROSE MONOHYDRATE SCH MLS/HR: 50 INJECTION, SOLUTION INTRAVENOUS at 08:53

## 2021-10-23 RX ADMIN — SODIUM CHLORIDE SCH MLS/HR: 9 INJECTION, SOLUTION INTRAVENOUS at 04:02

## 2021-10-23 RX ADMIN — DEXAMETHASONE SODIUM PHOSPHATE SCH MG: 4 INJECTION, SOLUTION INTRAMUSCULAR; INTRAVENOUS at 02:45

## 2021-10-23 RX ADMIN — POTASSIUM CHLORIDE SCH MLS/HR: 7.46 INJECTION, SOLUTION INTRAVENOUS at 06:48

## 2021-10-23 RX ADMIN — Medication SCH: at 21:00

## 2021-10-23 RX ADMIN — LORATADINE SCH MG: 10 TABLET ORAL at 08:54

## 2021-10-23 RX ADMIN — DEXAMETHASONE SODIUM PHOSPHATE SCH MG: 4 INJECTION, SOLUTION INTRAMUSCULAR; INTRAVENOUS at 04:02

## 2021-10-23 RX ADMIN — POTASSIUM CHLORIDE, DEXTROSE MONOHYDRATE AND SODIUM CHLORIDE SCH MLS/HR: 300; 5; 450 INJECTION, SOLUTION INTRAVENOUS at 20:05

## 2021-10-23 RX ADMIN — OMEPRAZOLE SCH MG: 20 CAPSULE, DELAYED RELEASE ORAL at 08:55

## 2021-10-23 RX ADMIN — ASPIRIN SCH MG: 81 TABLET ORAL at 08:54

## 2021-10-23 RX ADMIN — DEXAMETHASONE SODIUM PHOSPHATE SCH MG: 4 INJECTION, SOLUTION INTRAMUSCULAR; INTRAVENOUS at 20:00

## 2021-10-23 RX ADMIN — Medication SCH: at 11:53

## 2021-10-23 RX ADMIN — LIDOCAINE SCH PATCH: 50 PATCH CUTANEOUS at 08:55

## 2021-10-23 RX ADMIN — MECLIZINE HYDROCHLORIDE SCH MG: 25 TABLET ORAL at 08:54

## 2021-10-23 RX ADMIN — POTASSIUM CHLORIDE SCH MLS/HR: 7.46 INJECTION, SOLUTION INTRAVENOUS at 05:57

## 2021-10-23 RX ADMIN — POTASSIUM CHLORIDE SCH MLS/HR: 7.46 INJECTION, SOLUTION INTRAVENOUS at 07:47

## 2021-10-23 RX ADMIN — CHLORTHALIDONE SCH MG: 25 TABLET ORAL at 08:55

## 2021-10-23 RX ADMIN — LEVOTHYROXINE SODIUM SCH MCG: 75 TABLET ORAL at 06:00

## 2021-10-23 NOTE — REP
INDICATION:

Fever, CT abd/pelvis suggest PNA.



COMPARISON:

Multiple the latest yesterday at 5:36 p.m. also portable



TECHNIQUE:

Portable



FINDINGS:

The technique utilized in obtaining the radiograph has magnified the cardiac

silhouette and accentuated the interstitial markings.



The cardiomediastinal silhouette is unchanged.  Mild cardiomegaly accentuated by

technique cannot be ruled out.



Since the last exam, a diffuse increase in the inter 6 show markings has developed

particularly in the right upper lobe.  The pleural angles remain sharp.  The osseous

structures are stable.



IMPRESSION:

Increased interstitial markings, as described above, asymmetric pulmonary edema versus

developing pneumonia.





<Electronically signed by Neo Saenz > 10/23/21 [id number]

## 2021-10-23 NOTE — IPNPDOC
Subjective


Date Seen


The patient was seen on 10/23/21.





Subjective


Chief Complaint/HPI


Mrs. Chaudhary is a 76-year-old female with sciatica and diabetes mellitus type 2 

who presents with intractable back pain and then found to have e

ncephalitis/meningitis and DKA. Last night, she was alkalotic with pH of 7.56 

and pCO2 of 20.8. Spinal tap demonstrated WBC of 24, RBC<2, Total protein 76, 

and Glucose 85.  





Early morning, ABG was repeated and pH 7.046 with pCO2 38.3. Repeat ABG 

confirmed acidosis at 7.196 and pCO2 21.3. Lactic acid was also elevated at 5.5.

I ordered for another fluid bolus (1L NS) and bicarb drip. I went to see 

patient. She still had the stiff neck and AMS. Her left pupil was larger than 

right and responded better to light than left. She will open eyes to voice, but 

not verbal. She is still not moving arms and legs. I had called daughter and let

her know patient was not doing well. We discussed code status and agreed to 

DNR/DNI. With her lactic acidosis, I ordered a CT angio abd/pelvis to look for 

ischemic bowel. Imaging was negative. 





The morning BMP was drawn and resulted to demonstrate high anion gap metabolic 

acidosis with Bicarb of 10 and anion gap of 22. Lactic acid was 10.9. I ordered 

for acetone/B-hydroxybutyrate which was high at 21.78. Switched fluids from 

bicarb to D5W/0.45 NS and add on insulin drip. I discontinued the dexamethasone 

since her meningitis/encephalitis panel was negative and she was in DKA. 





VBG demonstrates improvement in acidosis (pH 7.379) and latic acid (now down to 

4.2). Bicarb increased to 15 and anion gap decreased to 17. 





I reached out to neurology (Dr. Manley) about the anisocoria and the 

meningitis/encephalitis. Recommended MRI brain with and without contrast. I 

discussed the case and consulted ID (Dr. Friedman). CSF results sounded more like a 

viral etiology and it may be a virus that is not on the panel. Recommended MRI.





Later in the afternoon, she was agitated and in pain. She was moving her arms 

and legs spontaneously. It may have been the tramadol and Dilaudid that had made

her lethargic. She has allergies to morphine and she would not be able to 

tolerate orals for tramadol. Will try a lower dose of Dilaudid.  Otherwise, 

since patient already had contrast for the CT angio abdomen and pelvis, they 

cannot do the MRI with contrast of brain today.  We will need to wait 24 hours 

for contrast study. I spoke with Dr. Manley. Kerri with MRI without contrast.





Objective


Physical Examination


General Exam:  Negative: Alert, Cooperative


Eye Exam:  Positive: Other Eye Symptoms (Left pupil larger than right pupil); 


   Negative: Sclera icteric


Neck Exam:  Negative: Supple (Stiff)


Chest Exam:  Positive: Clear to auscultation; 


   Negative: Wheezing


Heart Exam:  Positive: Tachycardic, Regular Rhythm


Abdomen Exam:  Positive: Normal bowel sounds, Soft


Extremity Exam:  Positive: Edema


Neuro Exam:  Positive: Other (Patient had immediate pain bilateral with straight

leg raise )


Psych Exam:  Positive: Other (Unable to obtain as she was lethargic); 


   Negative: Mental status NL, Oriented x 3





Assessment /Plan


Assessment


Mrs. Chaudhary is a 76-year-old female with sciatica and diabetes mellitus type 2 

who presents with intractable back pain and then found to have 

encephalitis/meningitis and DKA. Patient's blood cultures did return back 

positive with gram positive cocci, pending results. Patient's infection may have

precipitated the DKA. Treating DKA per DKA protocol.





Otherwise, patient noted to have aniscoria, AMS and persistent weakness in her 

arms and legs. Ordered for IV thiamine. Spoke with neurology. Possibly related 

to her encephalitis/meningitis. Discussed case and consulted ID. This may be a 

virus that is not on our panel. Recommended MRI brain.





Plan/VTE


VTE Prophylaxis Ordered?:  Yes





Plan


1. Sepsis


Most likely secondary to viral encephalitis/meningitis


-Blood cultures positive for gram positive cocci


-Continue meropenem and vancomycin day 2





2.  Encephalitis/meningitis


CSF positive for WBC and protein


Encephalitis/meningitis panel negative.  May be virus not on our panel


Supportive care





3.  High anion gap metabolic acidosis


Secondary to lactic acid and ketoacidosis


IV fluids and treatment of DKA





4.  DKA 


This may be euvolemic DKA or ketoacidosis


May have been triggered by infection, inability to tolerate orals, and no 

insulin


Insulin drip and D5W half-normal saline with 40 of KCL





5.  Intractable low back pain secondary to severe lumbar spinal stenosis


Patient cannot tolerate orals


Patient has allergy to morphine


PRN IV ketorolac and intermittent doses of IV Dilaudid





6.  Hypothyroidism


Converted oral levothyroxine to IV levothyroxine





7.  GERD


Continue omeprazole





8.  DVT prophylaxis


Lovenox





Disposition: Pending clinical improvement, MRI, and culture results





VS, I&O, 24H, Fishbone


Vital Signs/I&O





Vital Signs








  Date Time  Temp Pulse Resp B/P (MAP) Pulse Ox O2 Delivery O2 Flow Rate FiO2


 


10/23/21 12:48 98.7       


 


10/23/21 12:00  98  117/74 (88) 99 Nasal Cannula 2.0 


 


10/23/21 10:36   22     














I&O- Last 24 Hours up to 6 AM 


 


 10/23/21





 06:00


 


Intake Total 600 ml


 


Balance 600 ml











Laboratory Data


24H LABS


Laboratory Tests 2


10/22/21 17:24: 


Total Bilirubin 1.2H, Direct Bilirubin 0.4H, Aspartate Amino Transf (AST/SGOT) 

108H, Alanine Aminotransferase (ALT/SGPT) 92H, Alkaline Phosphatase 109, Ammonia

< 10, Total Protein 6.7, Albumin 2.8L, Albumin/Globulin Ratio 0.7L


10/22/21 17:42: 


Blood Gas Bicarbonate Standard 22.8, Arterial Blood pH 7.560H, Arterial Blood 

Partial Pressure CO2 20.8L, Arterial Blood Partial Pressure O2 71.2L, Arterial 

Blood Total CO2 18.8L, Arterial Blood HCO3 18.2L, Arterial Blood Base Excess -

2.0, Arterial Blood Oxygen Saturation 95.3


10/22/21 18:45: 


CSF Appearance CLEAR, CSF Color COLORLESS, CSF WBC (Auto) 7, CSF RBC (Auto) < 

2H, CSF Glucose (Tube 1) TUBE 3, CSF Total Protein (Tube 1) TUBE 3, CSF Cell 

Count Tube # TUBE 4, CSF Mononuclear Cells % (Auto) 16.7H, CSF Polynuclear WBCs 

(%) , CSF Glucose 85H, CSF Total Protein 76H


10/22/21 18:53: 


Immature Granulocyte % (Auto) 1.7, Neutrophils (%) (Auto) 90.3H, Lymphocytes (%)

(Auto) 6.5L, Monocytes (%) (Auto) 0.8L, Eosinophils (%) (Auto) 0.0, Basophils 

(%) (Auto) 0.7, Neutrophils # (Auto) 6.8, Lymphocytes # (Auto) 0.5L, Monocytes #

(Auto) 0.1, Eosinophils # (Auto) 0.0, Basophils # (Auto) 0.1, Nucleated Red 

Blood Cells % (auto) 0.0, Anion Gap 12, Glomerular Filtration Rate > 60.0, 

Lactic Acid Level 3.2*H, Calcium Level 9.3


10/22/21 23:34: Lactic Acid Followup at 4 Hours 5.5*H


10/23/21 05:31: 


Urine Color YELLOW, Urine Appearance CLOUDYH, Urine pH 5.0, Urine Specific 

Gravity 1.015, Urine Protein 2+H, Urine Glucose (UA) NEGATIVE, Urine Ketones 

1+H, Urine Blood 2+H, Urine Nitrite NEGATIVE, Urine Bilirubin NEGATIVE, Urine 

Urobilinogen 0.2, Urine Leukocyte Esterase NEGATIVE, Urine WBC (Auto) 1, Urine 

RBC (Auto) 34H, Urine Hyaline Casts (Auto) 0, Urine Bacteria (Auto) 1+H, Urine 

Squamous Epithelial Cells 0, Urine Mucus (Auto) SMALL, Urine Sperm (Auto) 


10/23/21 05:55: 


Blood Gas Bicarbonate Standard 9.1L, Arterial Blood pH 7.046*L, Arterial Blood 

Partial Pressure CO2 38.3, Arterial Blood Partial Pressure O2 23.3*L, Arterial 

Blood Total CO2 11.4L, Arterial Blood HCO3 10.3L, Arterial Blood Base Excess 

-19.5L, Arterial Blood Oxygen Saturation 19.7L


10/23/21 06:26: 


Blood Gas Bicarbonate Standard 11.1L, Arterial Blood pH 7.196*L, Arterial Blood 

Partial Pressure CO2 21.3L, Arterial Blood Partial Pressure O2 88.7, Arterial 

Blood Total CO2 8.7L, Arterial Blood HCO3 8.1L, Arterial Blood Base Excess -

18.1L, Arterial Blood Oxygen Saturation 93.4L


10/23/21 07:33: 


Nucleated Red Blood Cells % (auto) 0.0, Anion Gap 22H, Glomerular Filtration 

Rate 60.0, Estimated Mean Plasma Glucose 131H, Hemoglobin A1c 6.2, Osmolality 

299, Lactic Acid Level 10.9*H, Calcium Level 8.7L, Phosphorus Level 6.1H, B-

Hydroxybutyrate 21.78H


10/23/21 10:05: Methicillin-Resist S.aureus DNA PCR NOT DETECTED


10/23/21 10:36: Bedside Glucose (Misc Panel) 276H


10/23/21 11:39: 


Anion Gap 17H, Glomerular Filtration Rate > 60.0, Osmolality 305H, Calcium Level

7.8L, Phosphorus Level 3.2#, Blood Gas Bicarbonate Standard 17.4, Venous Blood 

pH 7.379, Venous Blood Partial Pressure CO2 25.5L, Venous Blood Partial Pressure

O2 134.9H, Venous Blood Total Carbon Dioxide 15.5L, Venous Blood HCO3 14.7L, 

Venous Blood Oxygen Saturation 98.3H, Venous Blood Base Excess -8.8L


10/23/21 11:47: Bedside Glucose (Misc Panel) 291H


10/23/21 12:04: Lactic Acid Followup at 4 Hours 4.2*H


10/23/21 12:46: Bedside Glucose (Misc Panel) 319H


10/23/21 13:38: Bedside Glucose (Misc Panel) 274H


CBC/BMP


Laboratory Tests


10/22/21 18:53








10/23/21 07:33








10/23/21 11:39








Microbiology





Microbiology


10/22/21 Blood Culture - Preliminary, Resulted


           


10/22/21 Blood Culture - Preliminary, Resulted


           


10/22/21 Gram Stain - Preliminary, Resulted


           


10/22/21 CSF Culture, Resulted


           Pending


10/22/21 - Final, Complete











JAYY SANTACRUZ DO               Oct 23, 2021 14:20

## 2021-10-23 NOTE — REPVR
PROCEDURE INFORMATION: 

Exam: CTA Abdomen and Pelvis With Contrast 

Exam date and time: 10/23/2021 8:40 AM 

Age: 76 years old 

Clinical indication: Abdominal pain; Generalized; Additional info: Lactic 

acidosis, ischemic bowel? 



TECHNIQUE: 

Imaging protocol: Computed tomographic angiography of the abdomen and pelvis 

with contrast material. 

3D rendering (Not supervised by radiologist): MIP and/or 3D reconstructed 

images were created by the technologist. 

Radiation optimization: All CT scans at this facility use at least one of these 

dose optimization techniques: automated exposure control; mA and/or kV 

adjustment per patient size (includes targeted exams where dose is matched to 

clinical indication); or iterative reconstruction. 

Contrast material: ISOVUE 370; Contrast volume: 100 ml; Contrast route: 

INTRAVENOUS (IV);  



COMPARISON: 

BLADDER (LIMITED PELVIC) US 10/22/2021 1:58 PM 



FINDINGS: 

Tubes, catheters and devices: Connell catheter in a collapsed urinary bladder. 



Lungs: The visualized lungs demonstrate mildly heterogeneous aeration with 

dependent atelectasis or infiltrates right greater than left. 

Pleural spaces: Small amounts of pleural fluid right greater than left. No 

pneumothorax. 

Heart: The heart is enlarged, as is the right ventricle.

Mediastinal space: Moderate hiatal hernia. 



Aorta: Moderate atherosclerotic changes of the nonaneurysmal abdominal aorta 

are noted without dissection flap. 

Celiac trunk and mesenteric arteries: There is moderate narrowing the origin of 

the celiac axis. Mild narrowing the origin of the SMA. The MEHUL origin 

demonstrates mild disease. 

Renal arteries: Moderate bilateral renal artery stenosis. 

Right iliac arteries: Mild iliac arterial disease on the right. 

Left iliac arteries: Mild iliac arterial disease on the left. 

Other arteries: No central arterial thrombus. 

Portal Venous System: No mesenteric venous gas. 

Hepatic veins: Reflux of contrast into the IVC and hepatic veins can be seen 

with a component of right heart dysfunction. 



Liver: Homogeneous liver without enlargement. No portal venous gas. 

Gallbladder and bile ducts: Unremarkable. No calcified stones. No ductal 

dilation. 

Pancreas: Unremarkable. No mass. No ductal dilation. 

Spleen: No splenic lesions. 

Adrenal glands: The adrenals are poorly seen. 

Kidneys and ureters: No definite renal stones or hydronephrosis. The kidneys 

enhance symmetrically with mild volume loss. 

Stomach and bowel: Mild-to-moderate retained stool throughout the colon without 

bowel obstruction. No pneumatosis. The cecum sits low in the pelvis. 

Appendix: Appendix not seen. 

Intraperitoneal space: No free air or free fluid. 

Lymph nodes: No confluent lymphadenopathy. 



Urinary bladder: Unremarkable. No mass. 

Reproductive: Unremarkable as visualized. 

Bones/joints: Osteopenia and degenerative changes without acute fracture. No 

lytic or blastic disease. There is a scoliosis. Anterolisthesis of L4 on L5 and 

L5 on S1 with retrolisthesis of L1 on L2 and L2 on L3 probably degenerative. 

Transitional anatomy at the lumbosacral junction of the spine. 

Soft tissues: Anasarca changes. The inguinal rings are open with fat 

bilaterally. 



Other findings: Motion artifact. 



IMPRESSION: 

1. Pleural fluid with subjacent atelectasis or infiltrates in the lung bases.  

Follow-up recommended.

2. Cardiomegaly with findings suggestive of right heart dysfunction.

3. Atherosclerotic changes of the nonaneurysmal abdominal aorta extending into 

the mesenteric, renal, and iliac arteries without high-grade stenosis or 

central arterial thrombus.

4. No focal bowel wall thickening, pneumatosis, portal venous gas, or 

mesenteric venous gas.

5. No bowel obstruction or free air.

6. Distended gallbladder without definite stones or choledocholithiasis but 

ultrasound follow-up might be considered.

7. Anasarca changes.

8. Motion limited exam.

9. Retained stool.



Electronically signed by: Ike Nur On 10/23/2021  09:41:44 AM

## 2021-10-23 NOTE — REPVR
PROCEDURE INFORMATION: 

Exam: MR Head Without Contrast 

Exam date and time: 10/23/2021 6:46 PM 

Age: 76 years old 

Clinical indication: Headache not specified; Patient HX: PT is confused, and 

pain in lower back and has a fever; Additional info: Encephalitis/meningitis 



TECHNIQUE: 

Imaging protocol: MR of the head without contrast. 



COMPARISON: 

CT Head without contrast 10/22/2021 5:01 PM 



FINDINGS: 

Brain: There is no acute infarct. No acute intracranial hemorrhage is seen. No 

mass, mass effect, midline shift, or herniation is noted. There are 

non-specific foci of T2 and FLAIR hyperintensity in the periventricular and 

subcortical white matter and ruby, which are likely the sequela of chronic 

small vessel ischemic injury. Incidental note is made of a right parafalcine 

calcification. 

Cerebral ventricles: The ventricles are mildly dilated in proportion to the 

sulci, which is compatible with mild generalized cerebral and cerebellar volume 

loss. Incidental note is made of a normal variant cavum septum pellucidum and 

cavum vergae, which is persistence of the embryological fluid-filled space 

between the leaflets of the septum pellucidum. 

Bones/joints: Unremarkable. 

Paranasal sinuses: The sinuses are well-aerated. No air-fluid levels are noted 

in the sinuses. 

Mastoid air cells: The mastoid air cells are well aerated. 

Orbital cavity: Unremarkable. 

Soft tissues: Unremarkable. 



IMPRESSION: 

1. No acute intracranial abnormality. 

2. Mild cerebral and cerebellar atrophy and chronic microangiopathic changes. 



Electronically signed by: Marvel Traylor On 10/23/2021  20:51:11 PM

## 2021-10-23 NOTE — REPVR
PROCEDURE INFORMATION: 

Exam: MR Cervical Spine Without Contrast 

Exam date and time: 10/23/2021 6:46 PM 

Age: 76 years old 

Clinical indication: Pain; Lower back; Patient HX: PT is confused and has a 

fever; Additional info: Encephalitis/meningitis 



TECHNIQUE: 

Imaging protocol: Multiplanar magnetic resonance images of the cervical spine 

without contrast. 



COMPARISON: 

No relevant prior studies available. 



FINDINGS: 

Vertebrae: There is a reversal of the normal cervical lordosis. No fracture is 

noted. The bone marrow signal is unremarkable. No infiltrative marrow replacing 

process is present. There is no evidence for discovertebral osteomyelitis. 

Spinal cord: Normal. No cord compression. No spinal cord edema or hemorrhage. 

Spinal epidural space: No abnormal epidural fluid collection. 

C2-C3: The disc height is preserved. No disc herniation, spinal canal stenosis, 

or neural foraminal stenosis is noted. There is severe osteoarthritis of the 

left facet joint and mild osteoarthritis of the right facet joint. 

C3-C4: The disc height is preserved. There is a 2 mm grade 1 anterolisthesis of 

C3 on C4 with uncovering of the disc posteriorly, severe osteoarthritis of the 

facet joints, a left facet joint effusion, and fissuring of the anterior 

portion of the annulus fibrosus. There is mild spinal canal stenosis, severe 

right neural foraminal stenosis, and mild left neural foraminal stenosis. 

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 

disc between the C4 and C5 vertebral bodies, partial bony bridging across the 

disc space, and bony bridging across the facet joints. The spinal canal neural 

foramina are patent. 

C5-C6: There is severe loss of disc height, a 3 mm retrolisthesis of C5 on C6, 

a broad-based posterior disc osteophyte complex, bilateral uncovertebral 

hypertrophy, and endplate spurs projecting anteriorly. There is mild spinal 

canal stenosis and severe bilateral neural foraminal stenosis. 

C6-C7: There is severe loss of disc height, a broad-based posterior disc 

osteophyte complex, bilateral uncovertebral hypertrophy, fissuring of the 

anterior portion of the annulus fibrosus, and endplate spurs projecting 

anteriorly. There is mild spinal canal stenosis and mild bilateral neural 

foraminal stenosis. The facet joints are unremarkable. 

C7-T1: The disc height is preserved. There is a 3 mm grade 1 anterolisthesis of 

C7 on T1 with uncovering of the disc posteriorly, fissuring of the anterior 

portion of the annulus fibrosus, and mild osteoarthritis of the facet joints. 

No spinal canal stenosis or neural foraminal stenosis is noted. 

T1-T2: The disc height is preserved. No disc herniation, spinal canal stenosis, 

or neural foraminal stenosis is noted. There is moderate osteoarthritis of the 

facet joints. 

T2-T3: Axial imaging was not performed at this level. The disc height is 

preserved. No disc herniation, spinal canal stenosis, or neural foraminal 

stenosis is noted. There is moderate osteoarthritis of the facet joints. 

T3-T4: Axial imaging was not performed at this level. There is severe loss of 

disc height, a broad-based posterior protrusion, endplate spurs projecting 

anteriorly, and mild osteoarthritis of the facet joints. There is mild left 

neural foraminal stenosis. No spinal canal or right neural foraminal stenosis 

is noted. 

T4-T5: Axial imaging was not performed at this level. Moderate loss of disc 

height is present. No disc herniation, spinal canal stenosis, or neural 

foraminal stenosis is noted. There is ankylosis of the facet joints and 

endplate spurs projecting anteriorly. 

Soft tissues: There is mildly increased T2 signal in the cervical paraspinal 

muscles. 



Prevertebral and retropharyngeal spaces: There is a small amount of 

retropharyngeal fluid at the C4 and C5 levels, which may represent a 

retropharyngeal space effusion. 

Vertebral arteries: There is abnormal T2 hyperintense signal in the 

transforaminal segment of the right vertebral artery. 



Other findings: There is diffuse disc desiccation. 



IMPRESSION: 

1. Abnormal T2 hyperintense signal in the transforaminal segment of the right 

vertebral artery, which may be flow related in nature or indicate a vertebral 

artery dissection or stenosis. Further evaluation can be performed with a CTA 

neck with intravenous contrast. 

2. Small amount of retropharyngeal fluid at the C4 and C5 levels, which may 

represent a retropharyngeal space effusion. 

3. Mildly increased T2 signal in the cervical paraspinal muscles, which may 

represent grade 1 strains or myositis. 

4. C3-C4: Mild spinal canal stenosis, severe right neural foraminal stenosis, 

and mild left neural foraminal stenosis. 

5. C5-C6: Mild spinal canal stenosis and severe bilateral neural foraminal 

stenosis. 

6. C6-C7: Mild spinal canal stenosis and mild bilateral neural foraminal 

stenosis. 

7. T3-T4: Mild left neural foraminal stenosis. 



Electronically signed by: Marvel Traylor On 10/23/2021  20:50:50 PM

## 2021-10-24 VITALS — SYSTOLIC BLOOD PRESSURE: 109 MMHG | DIASTOLIC BLOOD PRESSURE: 74 MMHG

## 2021-10-24 VITALS — DIASTOLIC BLOOD PRESSURE: 86 MMHG | SYSTOLIC BLOOD PRESSURE: 133 MMHG

## 2021-10-24 VITALS — DIASTOLIC BLOOD PRESSURE: 66 MMHG | SYSTOLIC BLOOD PRESSURE: 107 MMHG

## 2021-10-24 VITALS — SYSTOLIC BLOOD PRESSURE: 105 MMHG | DIASTOLIC BLOOD PRESSURE: 65 MMHG

## 2021-10-24 VITALS — SYSTOLIC BLOOD PRESSURE: 87 MMHG | DIASTOLIC BLOOD PRESSURE: 64 MMHG

## 2021-10-24 LAB
B-OH-BUTYR SERPL-MCNC: 1.04 MG/DL (ref ?–2.81)
BASE EXCESS BLDV CALC-SCNC: -9 MMOL/L (ref -2–2)
BUN SERPL-MCNC: 30 MG/DL (ref 7–18)
CALCIUM SERPL-MCNC: 7.4 MG/DL (ref 8.8–10.2)
CHLORIDE SERPL-SCNC: 114 MEQ/L (ref 98–107)
CO2 BLDV CALC-SCNC: 17 MEQ/L (ref 24–28)
CO2 SERPL-SCNC: 20 MEQ/L (ref 21–32)
CREAT SERPL-MCNC: 0.7 MG/DL (ref 0.55–1.3)
GFR SERPL CREATININE-BSD FRML MDRD: > 60 ML/MIN/{1.73_M2} (ref 39–?)
GLUCOSE SERPL-MCNC: 189 MG/DL (ref 70–100)
HCO3 BLDV-SCNC: 16 MEQ/L (ref 23–27)
HCO3 STD BLDV-SCNC: 17.3 MEQ/L
HCT VFR BLD AUTO: 34.2 % (ref 36–47)
HGB BLD-MCNC: 11.3 G/DL (ref 12–15.5)
MAGNESIUM SERPL-MCNC: 2.5 MG/DL (ref 1.8–2.4)
MCH RBC QN AUTO: 27.3 PG (ref 27–33)
MCHC RBC AUTO-ENTMCNC: 33 G/DL (ref 32–36.5)
MCV RBC AUTO: 82.6 FL (ref 80–96)
OSMOLALITY SERPL: 297 MOSM/KG (ref 280–301)
PCO2 BLDV: 32 MMHG (ref 38–50)
PH BLDV: 7.32 UNITS (ref 7.33–7.43)
PHOSPHATE SERPL-MCNC: 3.7 MG/DL (ref 2.5–4.9)
PLATELET # BLD AUTO: 173 10^3/UL (ref 150–450)
PO2 BLDV: 148.2 MMHG (ref 30–50)
POTASSIUM SERPL-SCNC: 4.8 MEQ/L (ref 3.5–5.1)
RBC # BLD AUTO: 4.14 10^6/UL (ref 4–5.4)
SAO2 % BLDV: 98.9 % (ref 60–80)
SODIUM SERPL-SCNC: 141 MEQ/L (ref 136–145)
WBC # BLD AUTO: 11.1 10^3/UL (ref 4–10)

## 2021-10-24 RX ADMIN — ATROPINE SULFATE PRN DROP: 10 SOLUTION/ DROPS OPHTHALMIC at 19:07

## 2021-10-24 RX ADMIN — DEXAMETHASONE SODIUM PHOSPHATE SCH MG: 4 INJECTION, SOLUTION INTRAMUSCULAR; INTRAVENOUS at 02:00

## 2021-10-24 RX ADMIN — POTASSIUM CHLORIDE, DEXTROSE MONOHYDRATE AND SODIUM CHLORIDE SCH MLS/HR: 300; 5; 450 INJECTION, SOLUTION INTRAVENOUS at 01:01

## 2021-10-24 RX ADMIN — HYDROMORPHONE HYDROCHLORIDE PRN MG: 1 INJECTION, SOLUTION INTRAMUSCULAR; INTRAVENOUS; SUBCUTANEOUS at 10:45

## 2021-10-24 RX ADMIN — MAGNESIUM SULFATE IN DEXTROSE SCH MLS/HR: 10 INJECTION, SOLUTION INTRAVENOUS at 01:36

## 2021-10-24 RX ADMIN — ATROPINE SULFATE PRN DROP: 10 SOLUTION/ DROPS OPHTHALMIC at 21:25

## 2021-10-24 RX ADMIN — MAGNESIUM SULFATE IN DEXTROSE SCH MLS/HR: 10 INJECTION, SOLUTION INTRAVENOUS at 02:29

## 2021-10-24 RX ADMIN — Medication SCH: at 04:16

## 2021-10-24 RX ADMIN — Medication SCH: at 19:40

## 2021-10-24 RX ADMIN — Medication SCH: at 03:07

## 2021-10-24 RX ADMIN — Medication SCH: at 05:18

## 2021-10-24 RX ADMIN — Medication SCH: at 01:12

## 2021-10-24 RX ADMIN — HYDROMORPHONE HYDROCHLORIDE PRN MG: 1 INJECTION, SOLUTION INTRAMUSCULAR; INTRAVENOUS; SUBCUTANEOUS at 21:52

## 2021-10-24 RX ADMIN — LIDOCAINE SCH PATCH: 50 PATCH CUTANEOUS at 13:07

## 2021-10-24 RX ADMIN — SCOPALAMINE SCH MG: 1 PATCH, EXTENDED RELEASE TRANSDERMAL at 10:01

## 2021-10-24 NOTE — CR
CONSULTATION

DATE: 10/24/2021



REASON FOR CONSULTATION:  I was asked to consult by Dr. Estrada for evaluation of

possible meningitis and sepsis.  



HISTORY OF PRESENT ILLNESS:  Ms. Chaudhary is a 76-year-old female with a history

of diabetes and degenerative disc disease, who presented to the emergency room

with severe intractable back pain.  The patient lives with her daughter and the

information was provided by the daughter.  She was noted to be very lethargic

in the emergency room.  The patient then spiked a temperature of 104.3.  At

baseline, her cognition is fairly well, but she has severe pain from lumbar

spine degenerative disc disease and cervical central canal stenosis and history

of problems with IT band.  The patient had a lumbar spine MRI, which did not

demonstrate any cauda equina. Bladder ultrasound showed no acute retention. 

She has a lumbar puncture, which initially white cell count was 24, when in the

second tube was 7 with RBCs less than 4, 16% mononuclear cells, 83% PMNs, 85

glucose and total protein 76, which is slightly elevated.  Blood cultures were

positive for gram-positive cocci in chains, which is consistent with

streptococcus, CSF BioFire PCR was negative for pneumococcus and all other

pathogens. Gram stain had no organisms and no cells seen.  The patient also

developed diabetic ketoacidosis (DKA) with severe lactic acidosis.  He was

treated with IV insulin, hydration. Lactic acid increased to 10.9.  When I saw

her in the emergency room at 6 p.m. on 10/23, the patient was still

non-responsive. She just opened her eyes or moans when she is in pain, but did

not verbalize anything.  The patient was started on antibiotics with IV

meropenem and vancomycin.  She received one dose of Decadron for possible

meningitis that was discontinued because of severe diabetic ketoacidosis (DKA).

 



PAST MEDICAL HISTORY:  Significant for: 

  1.  Gastroesophageal reflux disease.

  2.  Hypertension.

  3.  Degenerative disc disease of the lumbar and cervical spine.

  4.  Sciatica.

  5.  Type 2 diabetes. 

  6.  Vaccinated with COVID vaccine on 2021. 



PAST SURGICAL HISTORY:  Negative. 



FAMILY HISTORY:  Father  of the flu at 76 and mother at 85 from a stroke. 



SOCIAL HISTORY:  She lives with her daughter.  She is a nonsmoker.  



MEDICATIONS: Vancomycin 750 mg IV q 12 hours, meropenem 1 gm IV q 8 hours,

hydromorphone as needed, Tylenol 650 mg by mouth q 4 hours as needed,

albuterol/Atrovent nebs q 4 hours as needed, lorazepam 1 mg IV as needed,

thiamine 100 mg IV daily, pantoprazole 40 mg IV daily, lidocaine one patch

daily, enoxaparin 40 mg subcutaneous daily, glucagon as needed, ketorolac 15 mg

IV q 6 hours as needed.  



ALLERGIES:  PENICILLIN.  The patient has received cefdinir in 2019, CODEINE,

ERYTHROMYCIN AND MORPHINE 



LABORATORY DATA:  White count on admission was 7.3, today was 8.9, hemoglobin

11.7, hematocrit 36.7, platelets 191.  Sodium 145, potassium 3.5, chloride 115,

bicarbonate 17, BUN 31, creatinine 0.85, glucose 230, lactic acid was 10.9,

down to 4.5, calcium 8, phosphorus 1.3, magnesium 1.6.  Blood culture is

gram-positive cocci in chains.  CSF culture is pending.  Gram stain is negative

and BioFire PCR was negative.  



Brain MRI showed no acute abnormalities done without contrast.  Mild cerebral

and cerebellar atrophy and chronic changes consistent with age.  



Chest x-ray showed interstitial markings, asymmetric pulmonary edema versus

developing pneumonia and cervical spine MRI showed severe degenerative disc

disease with severe osteoarthritis and facet osteoarthritis at multiple levels,

but concern of T2 hyperintense signal in the transforaminal segment in the

right vertebral artery, questionable stenosis or dissection.  Small amount of

retropharyngeal fluid at C4/C5, which may represent a infection.  



Lumbar spine MRI was limited due to motion artifact, extensive degenerative

changes.  



PHYSICAL EXAMINATION:  She is a sick looking female in moderate discomfort,

eyes closed.  Does not respond to questions but moans and groans in pain. T-max

was 104.3 on 10/22, currently temperature is 99.2, pulse 108, respirations 20,

02 saturation 99% on two liters nasal canula. General appearance:  Elderly

female in moderate discomfort.  Heart:  Normal S1, S2 tachycardiac.  No murmurs

appreciated.  Lungs:  Diffuse expiratory wheezes bilaterally.  Good air entry. 

No rhonchi or crackles.  Abdomen:  Soft, nontender.  No hepatosplenomegaly. 

Back was not examined.  Extremities:  Left leg with an area of erythema

demonstrated mostly around the lateral malleolus and extends to half the leg

below the knee.  Tender to touch and warm, not sure if this is related to

pressure versus cellulitis.  Neck is stiff with no range of motion and whenever

I try to flex it, she had knee flexion.  Cranial nerves intact except for

anisocoria.  Left pupil was larger than the right pupil, but they are both

responsive to light.  Seems to be moving all extremities with no focal deficit.

 



IMPRESSION:  This 76-year-old female who was admitted with acute mental status

changes, intractable back pain, fever up to 104.5, severe lactic acidosis

consistent with severe sepsis. The patient has developed diabetic ketoacidosis

(DKA).  She is being treated with IV fluid and started on IV meropenem

and vancomycin for treatment of sepsis and possible meningitis.  Her

cerebrospinal fluid is not consistent with meningitis, although having 25 white

cells in the initial CSF tube is not normal, but it could be related to

parameningeal focus rather than meningitis ? related to retropharyngeal process.


 CSF BioFire PCR was negative for

viral or bacterial etiologies.  Bacterial cultures of blood are positive for

gram-positive cocci in chains which are consistent with streptococcus, could be

pneumococcus versus beta hemolytic strep.  Other possible source of infection

could be left lower extremity cellulitis and that needs to be monitored.  The

patient is allergic to cephalosporins.  She has taken cefdinir in the past and

will be switched to IV ceftriaxone along with meropenem.  Until meningitis is

ruled out, I would suggest adding IV Decadron pending results of MRI.   



PLAN:  MRI brain and cervical spine were being done just after I see the

patient.  The case has been discussed with  Dr. Estrada, who will restart Decadron

until results of MRI.   Will monitor closely glucose.  If MRI shows no edema

and CSF culture is negative, this will be discontinued. Continue IV ceftriaxone

and vancomycin until results of culture. Will deescalate accordingly.  



Consult ENT about abnormal MRI cspine and ? retropharyngeal abscess



Thank you for the consultation.

FUAD

## 2021-10-24 NOTE — REP
INDICATION:

Rhonchi.



COMPARISON:

Portable chest, 10/23/2021.



TECHNIQUE:

Upright AP portable chest image was obtained.



FINDINGS:

There is cardiomegaly and pulmonary venous hypertension without congestive heart

failure.  There is airspace disease in the left lung base consistent with atelectasis

or pneumonia.  There are no significant pleural effusions.  The upper abdominal bowel

gas pattern is normal.  There are no bony abnormalities.



IMPRESSION:

1.  Cardiomegaly without congestive heart failure.

2.  Airspace disease in left lung base consistent with atelectasis or pneumonia.





<Electronically signed by Vik Arevalo > 10/24/21 [id number]

## 2021-10-24 NOTE — IPNPDOC
Subjective


Date Seen


The patient was seen on 10/24/21.





Subjective


Chief Complaint/HPI


Mrs. Chaudhary is a 76-year-old female with sciatica and diabetes mellitus type 2 

who presents with intractable back pain and then found to have e

ncephalitis/meningitis and DKA. Overnight, DKA resolved and she was taken off of

insulin drip. This morning, patient was not doing well. She was intermittently 

alert and minimal verbal expressing pain. She would intermittently go apneic and

not breath. When she did breath, she was very rhonchus and sounded wet. She has 

developed pitting edema. Preliminary blood cultures were positive for Group B s

trep. 





I contacted her daughter (HCP) and explained that she is not doing well and she 

was going in and out of apnea. She immediately came by and saw her mother. We 

discussed our options of continuing with treatment versus making her comfortable

known that she will soon pass away. She decided on comfort measures only. 

Patient will be made CMO.





Objective


Physical Examination


General Exam:  Positive: Moderate Distress; 


   Negative: Alert, Cooperative


Eye Exam:  Negative: Sclera icteric


Neck Exam:  Negative: Supple (Stiff)


Chest Exam:  Positive: Rhonchi


Heart Exam:  Positive: Tachycardic, Regular Rhythm


Abdomen Exam:  Positive: Normal bowel sounds, Soft, Tenderness


Extremity Exam:  Positive: Edema


Neuro Exam:  Positive: Other (intermittent apnea)


Psych Exam:  Negative: Mental status NL, Oriented x 3





Assessment /Plan


Assessment


Mrs. Chaudhary is a 76-year-old female with sciatica and diabetes mellitus type 2 

who presents with intractable back pain and then found to have 

encephalitis/meningitis and DKA. Patient's blood cultures did return back 

positive with gram positive cocci, pending results. Patient's infection may have

precipitated the DKA. Treating DKA per DKA protocol.





Otherwise, patient noted to have aniscoria, AMS and persistent weakness in her 

arms and legs. Ordered for IV thiamine. Spoke with neurology. Possibly related 

to her encephalitis/meningitis. Discussed case and consulted ID. This may be a 

virus that is not on our panel. Recommended MRI brain. Current MRI is without 

contrast, and encephalitis may not present on this current MRI image. Unable to 

do contrast study yesterday since she had the CT abd/pelvis with contrast 

yesterday. 





Patient condition declined. She sounded wet and rhonchus. She was struggling to 

breath and had periods of apnea. Patient is a DNR/DNI. I contacted the 

daughter/HCP and discussed our options. She decided on CMO.





Plan/VTE


VTE Prophylaxis Ordered?:  Yes





Plan


1. Sepsis


2.  Encephalitis/meningitis


3.  High anion gap metabolic acidosis


4.  DKA 


5.  Intractable low back pain secondary to severe lumbar spinal stenosis


6.  Hypothyroidism


7.  GERD


8.  DVT prophylaxis





Disposition: Discussed with daughter/HCP about patient's decline and poor 

prognosis. Patient was made CMO on 10/24/2021. Patient will be made ALC today.





VS, I&O, 24H, Fishbone


Vital Signs/I&O





Vital Signs








  Date Time  Temp Pulse Resp B/P (MAP) Pulse Ox O2 Delivery O2 Flow Rate FiO2


 


10/24/21 06:00  98 12 105/65 (78) 99 Nasal Cannula 4.0 


 


10/24/21 04:00 98.5       














I&O- Last 24 Hours up to 6 AM 


 


 10/24/21





 06:00


 


Intake Total 4875 ml


 


Output Total 1950 ml


 


Balance 2925 ml











Laboratory Data


24H LABS


Laboratory Tests 2


10/23/21 11:39: 


Blood Gas Bicarbonate Standard 17.4, Venous Blood pH 7.379, Venous Blood Partial

Pressure CO2 25.5L, Venous Blood Partial Pressure O2 134.9H, Venous Blood Total 

Carbon Dioxide 15.5L, Venous Blood HCO3 14.7L, Venous Blood Oxygen Saturation 

98.3H, Venous Blood Base Excess -8.8L, Anion Gap 17H, Glomerular Filtration Rate

> 60.0, Osmolality 305H, Calcium Level 7.8L, Phosphorus Level 3.2#


10/23/21 11:47: Bedside Glucose (Misc Panel) 291H


10/23/21 12:04: Lactic Acid Followup at 4 Hours 4.2*H


10/23/21 12:46: Bedside Glucose (Misc Panel) 319H


10/23/21 13:38: Bedside Glucose (Misc Panel) 274H


10/23/21 14:53: Bedside Glucose (Misc Panel) 233H


10/23/21 15:47: 


Blood Gas Bicarbonate Standard 16.5, Venous Blood pH 7.388, Venous Blood Partial

Pressure CO2 21.8L, Venous Blood Partial Pressure O2 154.3H, Venous Blood Total 

Carbon Dioxide 13.5L, Venous Blood HCO3 12.8L, Venous Blood Oxygen Saturation 

99.0H, Venous Blood Base Excess -10.1L, Anion Gap 14, Glomerular Filtration Rate

54.1, Calcium Level 7.9L, Phosphorus Level 2.0#L


10/23/21 15:55: Lactic Acid Level 6.0*H


10/23/21 15:56: Bedside Glucose (Misc Panel) 233H


10/23/21 17:04: Bedside Glucose (Misc Panel) 245H


10/23/21 17:55: Bedside Glucose (Misc Panel) 228H


10/23/21 19:37: Osmolality 301


10/23/21 19:38: Bedside Glucose (Misc Panel) 225H


10/23/21 20:51: Bedside Glucose (Misc Panel) 217H


10/23/21 21:12: 


Blood Gas Bicarbonate Standard 18.3, Venous Blood pH 7.487H, Venous Blood 

Partial Pressure CO2 17.9L, Venous Blood Partial Pressure O2 110.2H, Venous 

Blood Total Carbon Dioxide 13.8L, Venous Blood HCO3 13.2L, Venous Blood Oxygen 

Saturation 98.1H, Venous Blood Base Excess -7.7L, Anion Gap 13, Glomerular 

Filtration Rate > 60.0, Lactic Acid Followup at 4 Hours 4.5*H, Calcium Level 

8.0L, Phosphorus Level 1.3#L


10/23/21 21:53: Bedside Glucose (Misc Panel) 208H


10/23/21 23:00: Bedside Glucose (Misc Panel) 208H


10/23/21 23:53: Bedside Glucose (Misc Panel) 205H


10/24/21 00:17: Magnesium Level 1.6L


10/24/21 01:06: Bedside Glucose (Misc Panel) 179H


10/24/21 01:56: Bedside Glucose (Misc Panel) 197H


10/24/21 03:05: Bedside Glucose (Misc Panel) 213H


10/24/21 04:13: Bedside Glucose (Misc Panel) 196H


10/24/21 04:19: 


Nucleated Red Blood Cells % (auto) 0.0, Blood Gas Bicarbonate Standard 17.3, 

Venous Blood pH 7.318L, Venous Blood Partial Pressure CO2 32.0L, Venous Blood 

Partial Pressure O2 148.2H, Venous Blood Total Carbon Dioxide 17.0L, Venous 

Blood HCO3 16.0L, Venous Blood Oxygen Saturation 98.9H, Venous Blood Base Excess

-9.0L, Anion Gap 7L, Glomerular Filtration Rate > 60.0, Osmolality 297, Calcium 

Level 7.4L, Phosphorus Level 3.7#, Magnesium Level 2.5H, B-Hydroxybutyrate 1.04


10/24/21 05:16: Bedside Glucose (Misc Panel) 157H


10/24/21 07:55: Vancomycin Level Trough 11.6


CBC/BMP


Laboratory Tests


10/23/21 11:39








10/23/21 15:47








10/23/21 21:12








10/24/21 04:19








Microbiology





Microbiology


10/22/21 Blood Culture - Preliminary, Resulted


           Strep Agalactiae Group B


10/22/21 Blood Culture - Preliminary, Resulted


           Strep Agalactiae Group B


10/22/21 Gram Stain - Preliminary, Resulted


           


10/22/21 CSF Culture - Final, Resulted


           


10/22/21 - Final, Complete











JAYY SANTACRUZ DO               Oct 24, 2021 11:10

## 2021-10-25 RX ADMIN — ATROPINE SULFATE PRN DROP: 10 SOLUTION/ DROPS OPHTHALMIC at 05:09

## 2021-10-25 RX ADMIN — HYDROMORPHONE HYDROCHLORIDE PRN MG: 1 INJECTION, SOLUTION INTRAMUSCULAR; INTRAVENOUS; SUBCUTANEOUS at 16:55

## 2021-10-25 RX ADMIN — ATROPINE SULFATE PRN DROP: 10 SOLUTION/ DROPS OPHTHALMIC at 23:18

## 2021-10-25 RX ADMIN — HYDROMORPHONE HYDROCHLORIDE PRN MG: 1 INJECTION, SOLUTION INTRAMUSCULAR; INTRAVENOUS; SUBCUTANEOUS at 09:47

## 2021-10-25 RX ADMIN — LIDOCAINE SCH PATCH: 50 PATCH CUTANEOUS at 08:04

## 2021-10-25 RX ADMIN — HYDROMORPHONE HYDROCHLORIDE PRN MG: 1 INJECTION, SOLUTION INTRAMUSCULAR; INTRAVENOUS; SUBCUTANEOUS at 23:18

## 2021-10-25 RX ADMIN — ATROPINE SULFATE PRN DROP: 10 SOLUTION/ DROPS OPHTHALMIC at 02:02

## 2021-10-25 RX ADMIN — ATROPINE SULFATE PRN DROP: 10 SOLUTION/ DROPS OPHTHALMIC at 20:53

## 2021-10-25 RX ADMIN — HYDROMORPHONE HYDROCHLORIDE PRN MG: 1 INJECTION, SOLUTION INTRAMUSCULAR; INTRAVENOUS; SUBCUTANEOUS at 12:42

## 2021-10-25 RX ADMIN — Medication SCH: at 19:25

## 2021-10-26 RX ADMIN — ACETAMINOPHEN PRN MG: 650 SUPPOSITORY RECTAL at 09:21

## 2021-10-26 RX ADMIN — ATROPINE SULFATE PRN DROP: 10 SOLUTION/ DROPS OPHTHALMIC at 05:43

## 2021-10-26 RX ADMIN — ACETAMINOPHEN PRN MG: 650 SUPPOSITORY RECTAL at 18:29

## 2021-10-26 RX ADMIN — ATROPINE SULFATE PRN DROP: 10 SOLUTION/ DROPS OPHTHALMIC at 08:16

## 2021-10-26 RX ADMIN — HYDROMORPHONE HYDROCHLORIDE PRN MG: 1 INJECTION, SOLUTION INTRAMUSCULAR; INTRAVENOUS; SUBCUTANEOUS at 15:31

## 2021-10-26 RX ADMIN — LIDOCAINE SCH PATCH: 50 PATCH CUTANEOUS at 09:21

## 2021-10-26 RX ADMIN — Medication SCH: at 21:34

## 2021-10-26 RX ADMIN — HYDROMORPHONE HYDROCHLORIDE PRN MG: 1 INJECTION, SOLUTION INTRAMUSCULAR; INTRAVENOUS; SUBCUTANEOUS at 05:39

## 2021-10-26 RX ADMIN — ATROPINE SULFATE PRN DROP: 10 SOLUTION/ DROPS OPHTHALMIC at 03:42

## 2021-10-27 RX ADMIN — ACETAMINOPHEN PRN MG: 650 SUPPOSITORY RECTAL at 08:27

## 2021-10-27 RX ADMIN — LIDOCAINE SCH PATCH: 50 PATCH CUTANEOUS at 08:25

## 2021-10-27 RX ADMIN — SCOPALAMINE SCH MG: 1 PATCH, EXTENDED RELEASE TRANSDERMAL at 08:26

## 2021-10-27 RX ADMIN — Medication SCH: at 20:14

## 2021-10-27 RX ADMIN — HYDROMORPHONE HYDROCHLORIDE PRN MG: 1 INJECTION, SOLUTION INTRAMUSCULAR; INTRAVENOUS; SUBCUTANEOUS at 20:22

## 2021-10-28 RX ADMIN — HYDROMORPHONE HYDROCHLORIDE PRN MG: 1 INJECTION, SOLUTION INTRAMUSCULAR; INTRAVENOUS; SUBCUTANEOUS at 02:15

## 2021-10-28 RX ADMIN — HYDROMORPHONE HYDROCHLORIDE PRN MG: 1 INJECTION, SOLUTION INTRAMUSCULAR; INTRAVENOUS; SUBCUTANEOUS at 06:35

## 2021-10-28 RX ADMIN — LIDOCAINE SCH PATCH: 50 PATCH CUTANEOUS at 09:32

## 2021-10-28 NOTE — DS.PDOC
Discharge Summary


General


Date of Admission


Oct 22, 2021 at 13:04


Date of Discharge


10/28/21





Discharge Summary


PROCEDURES PERFORMED DURING STAY: 





Lumbar Puncture on 10/22/21.





ADMITTING DIAGNOSES: 


Severe Sepsis 2/2 suspected meningitis


Lactic acidosis


Spinal canal stenosis/Back pain


Weakness


HTN


DM2


Hypothyroidism


Seasonal allergies


GERD





DISCHARGE DIAGNOSES:


Severe Sepsis 2/2 meningitis vs pneumonia vs cellulitis


Diabetic Ketoacidosis (DKA)


High anion gap metabolic acidosis


Metabolic encephalopathy in the setting of severe sepsis


Acute Hypoxic Respiratory Failure


Pneumonia


L lower extremity cellulitis


Spinal canal stenosis/Back pain


Weakness


HTN


DM2


Hypothyroidism


Seasonal allergies


GERD





COMPLICATIONS/CHIEF COMPLAINT: Chronic Radicular Lumbar Pain, Intractable Low 

Kate.





HISTORY OF PRESENT ILLNESS: Obtained from H&P by Dr. Estrada: "Mrs. Chaudhary is a 

76-year-old female with static and diabetes mellitus type 2 who presents with 

intractable back pain.  Patient was given tramadol in the ED and she is been 

very lethargic since then.  When I saw her in the morning, she cannot answer any

questions.  She was moaning.  When I attempted straight leg raise, she 

immediately responded with pain.  I called patient's daughter for more 

information.  She does note she currently has IT band tightness in her hip 

frequently bothers her.  Wednesday night, she woke up with pain which 

progressively worsened.  She had to pick her mother up to sit in the wheelchair.

 Denies any recent falls or trauma.  She tells me at baseline, the patient 

cognition is pretty good.  While in the ED, CT of the lumbosacral spine was 

obtained which demonstrated severe central spinal canal stenosis.  MRI was 

obtained and did not demonstrate cauda equina.  Ultrasound of the bladder did 

not demonstrate any urinary retention.  Patient will be admitted for intractable

pain. 





I was notified by nurse that patient had a temperature of 104.3 and a heart rate

in the 150s. Respiratory rate also increased from 14 breaths/minute to 25 

breaths to minute. Blood pressure remained stable with SBP in the 140s to 150s. 

Patient met septic criteria. Ordered for blood cultures, repeat CBC, BMP, ABG, 

and lactic acid. Ordered for 30mL/kg of fluid and Meropenem and Vancomycin (due 

to PCN allergy). When I went down to see the patient she was still lethargic. 

She was able to open her eyes to verbal stimuli, but did not speak much. I tried

to flex her neck and it was stiff like a lead pipe. I tried to bend her knees, 

but movement of legs caused severe pain. I call daughter and updated her able 

patient's change of status. She tells me that she is normally able to move her 

neck without issue. 





With her high fever 104.3, neck rigidity, and AMS, I am concerned for bacterial 

meningitis. I contacted Anesthesiology, Dr. Lamb, for lumbar puncture. I ordered 

for dexamethasone, vancomycin, and meropenem. Patient has PCN allergy. Meropenem

would also cover for listeria. "





HOSPITAL COURSE: Patient is a 76-year-old female with spinal canal stenosis type

2 diabetes presented with intractable back pain MRI did not show cauda equina.  

Patient was found to have encephalitis unlikely bacterial meningitis as well as 

DKA.  She was found to be in severe sepsis.   She was ordered IV fluid 

resuscitation as well as dexamethasone, vancomycin and meropenem.  Lumbar 

puncture was performed, findings suggestive possible viral etiology. ID 

consultation was obtained, patient was assessed by Dr. Friedman. MRI C spine was 

performed with concerning findings for possible retropharyngeal abscess.  

Additional source of infection was likely left lower extremity cellulitis as 

well as possible bacterial pneumonia. Preliminary blood cultures positive for 

group B streptococcus. Patient's condition significantly declined, she was not 

responding to verbal or tactile stimuli.  Given her severe sepsis severe high 

anion gap acidosis as well as DKA she had a poor prognosis.  Her daughter 

decided for comfort measures only.  Patient passed away 2021 from 

severe sepsis.





DISCHARGE MEDICATIONS: Please see below.


 


ALLERGIES: Please see below.





PHYSICAL EXAMINATION ON DISCHARGE:


I was not present during patient's examination upon death.  Patient examined and

pronounced by RN.





LABORATORY DATA: Please see below.





IMAGING: 





CXR on 10/24/2021:


1.  Cardiomegaly without congestive heart failure.


2.  Airspace disease in left lung base consistent with atelectasis or pneumonia.





MRI Cervical Spine wo contrast (10/23/21):


1. Abnormal T2 hyperintense signal in the transforaminal segment of the right 


vertebral artery, which may be flow related in nature or indicate a vertebral 


artery dissection or stenosis. Further evaluation can be performed with a CTA 


neck with intravenous contrast. 


2. Small amount of retropharyngeal fluid at the C4 and C5 levels, which may 


represent a retropharyngeal space effusion. 


3. Mildly increased T2 signal in the cervical paraspinal muscles, which may 


represent grade 1 strains or myositis. 


4. C3-C4: Mild spinal canal stenosis, severe right neural foraminal stenosis, 


and mild left neural foraminal stenosis. 


5. C5-C6: Mild spinal canal stenosis and severe bilateral neural foraminal 


stenosis. 


6. C6-C7: Mild spinal canal stenosis and mild bilateral neural foraminal 


stenosis. 


7. T3-T4: Mild left neural foraminal stenosis. 





MRI brain wo contrast (10/23/21):


1. No acute intracranial abnormality. 





CTA chest (10/23/21):


1. Pleural fluid with subjacent atelectasis or infiltrates in the lung bases.  


Follow-up recommended.


2. Cardiomegaly with findings suggestive of right heart dysfunction.


3. Atherosclerotic changes of the nonaneurysmal abdominal aorta extending into 


the mesenteric, renal, and iliac arteries without high-grade stenosis or 


central arterial thrombus.


4. No focal bowel wall thickening, pneumatosis, portal venous gas, or 


mesenteric venous gas.


5. No bowel obstruction or free air.


6. Distended gallbladder without definite stones or choledocholithiasis but 


ultrasound follow-up might be considered.


7. Anasarca changes.


8. Motion limited exam.


9. Retained stool.





CT head wo contrast (10/22/21):


No acute intracranial abnormality.


Chronic microvascular ischemic changes.





MRI Lumbar Spine wo contrast (10/22/21):


FINDINGS:  Limited study secondary to motion artifact.  Specially T2 axial 


images are severely degraded by motion artifact.  Vertebral heights are 


maintained.  Loss of disc spaces at multiple levels with disc desiccation.  


Multilevel retropulsion anteriopulsion as described below.


Vertebrae:  Levoscoliosis of the lumbar spine.


Spinal cord: Normal signal. No cord compression. 


L1-L2:  4 mm retrolisthesis of L1 on L2 with posterior disc osteophyte 


formation, and bilateral facet joint arthropathy without any significant 


central spinal canal stenosis.  Severe bilateral neural foraminal narrowing, 


right greater than left with impingement on the exiting bilateral nerve roots.


L2-L3:  10.2 retrolisthesis of L2 on L3 with posterior disc osteophyte 


formation, bilateral facet joint arthropathy and ligamentum flavum hypertrophy 


resulting in severe central spinal canal stenosis, severe bilateral recess 


narrowing, and severe bilateral neural foraminal narrowing, with impingement of 


exiting bilateral L2 nerve roots and traversing bilateral L3 nerve roots.


L3-L4:  Diffuse disc bulge with right subarticular disc protrusion, bilateral 


facet joint arthropathy and ligamentum flavum hypertrophy resulting in severe 


central spinal canal stenosis, severe right lateral recess narrowing, mild left 


lateral recess narrowing, severe right and moderate left neural foraminal 


narrowing with impingement on exiting bilateral, right greater than left L3 


nerve roots and abutment of traversing right L4 nerve root.  


L4-L5:  5.4 mm anterolisthesis of L4 on L5 with uncovering of posterior disc, 


diffuse disc bulge with central disc protrusion measuring 5.3 x 7.3 mm, 


bilateral facet joint arthropathy and ligamentum flavum hypertrophy resulting 


in severe central spinal canal stenosis, severe bilateral recess narrowing, and 


severe bilateral neural foraminal narrowing with impingement on the exiting 


bilateral L4 and traversing bilateral L5 nerve roots.


L5-S1:  3.8 mm anterolisthesis of L4 on L5 with uncovering of posterior disc, 


diffuse disc bulge with bilateral facet joint arthropathy and ligamentum flavum 


hypertrophy without any significant central spinal canal stenosis, mild left 


lateral recess narrowing, and moderate to severe bilateral neural foraminal 


narrowing with impingement on the exiting bilateral L5 nerve roots.


Soft tissues: Unremarkable. 





IMPRESSION: 


Limited study secondary to motion artifact.  Specially T2 axial images are 


severely degraded by motion artifact.





Extensive degenerative changes as described in detail above.  Please see above 


dictation for individual levels.





CT Lumbar Spine wo contrast (10/22/21):


IMPRESSION: 


1. Advanced degenerative spondylosis as above with severe central spinal canal 


stenosis, worst at L3-L4 and L4-L5. 


2. Multilevel moderate to severe neural foraminal stenoses. 


3. Stable thoracolumbar scoliosis. 





DISPOSITION: 20 .





TIME SPENT ON DISCHARGE: 25 minutes





Vital Signs/I&Os





Vital Signs








  Date Time  Temp Pulse Resp B/P (MAP) Pulse Ox O2 Delivery O2 Flow Rate FiO2


 


10/28/21 09:00       3.0 


 


10/24/21 08:00 96.7 101 10 109/74 (86) 99 Nasal Cannula  














I&O- Last 24 Hours up to 6 AM 


 


 10/28/21





 06:00


 


Intake Total 0 ml


 


Output Total 2200 ml


 


Balance -2200 ml











Microbiology





Microbiology


10/22/21 Blood Culture - Final, Complete


           Strep Agalactiae Group B


10/22/21 Blood Culture - Final, Complete


           Strep Agalactiae Group B


10/22/21 Gram Stain - Final, Complete


           


10/22/21 CSF Culture - Final, Complete


           


10/22/21 - Final, Complete





Discharge Medications


Scheduled


Ascorbate Calcium/Bioflavonoid (Lia-C 1,000 mg Tablet) 1 Each Tablet, 1 EACH 

PO QPM, (Reported)


   DINNERTIME 


Aspirin (Aspirin EC) 81 Mg Tablet.dr, 81 MG PO DAILY, (Reported)


Atorvastatin Calcium (Atorvastatin Calcium) 80 Mg Tablet, 80 MG PO QHS, 

(Reported)


B2/Vits A,C,E/Lut/Zeaxanth/Min (Icaps Tablet) 1 Each Tablet.er, 1 TAB PO QPM, 

(Reported)


   DINNERTIME 


Miguel A/D3/Mag11/Zinc//Vick/Bor (Caltrate 600+D Plus Tablet) 1 Each Tablet, 1 TAB

PO QPM, (Reported)


   DINNERTIME 


Chlorthalidone (Chlorthalidone) 25 Mg Tablet, 25 MG PO DAILY, (Reported)


Cyanocobalamin (Vitamin B-12) (Vitamin B-12) 1,000 Mcg Tablet, 1,000 MCG PO QPM,

(Reported)


   DINNERTIME 


Dulaglutide (Trulicity) 0.75 Mg/0.5 Ml Pen.injctr, 0.75 MG SQ QWEEK, (Reported)


    


Levothyroxine Sodium (Synthroid) 75 Mcg Tablet, 75 MCG PO DAILY, (Reported)


Loratadine (Loratadine) 10 Mg Tablet, 10 MG PO DAILY, (Reported)


Meclizine HCl (Meclizine HCl) 25 Mg Tablet, 25 MG PO TID, (Reported)


   MORNING, SUPPER, NIGHT 


Metformin HCl (Metformin HCl) 1,000 Mg Tablet, 1,000 MG PO BID, (Reported)


Multivitamins (Thera M Plus Tablet) 1 Each Tablet, 1 TAB PO QPM, (Reported)


   DINNERTIME 


Omega-3 Fatty Acids/Fish Oil (Omega 3 1,000 mg Softgel) 1 Each Capsule, 1 CAP PO

QPM, (Reported)


   DINNERTIME 


Omeprazole (Omeprazole) 20 Mg Capsule.dr, 20 MG PO DAILY, (Reported)


Phenazopyridine HCl (Azo Urinary Pain Relief) 95 Mg Tablet, 95 MG PO QPM, 

(Reported)


   DINNERTIME 


Potassium Chloride (Potassium Chloride) 10 Meq Tab.er.prt, 10 MEQ PO DAILY, 

(Reported)





Scheduled PRN


Baclofen (Baclofen) 10 Mg Tablet, 10 MG PO TID PRN for MUSCLE SPASMS, (Reported)


Ibuprofen (Ibuprofen) 600 Mg Tablet, 600 MG PO TID PRN for PAIN LEVEL 1-5, 

(Reported)





Allergies


Coded Allergies:  


     Penicillins (Verified  Allergy, Mild, rash, 19)


     Sulfa (Sulfonamide Antibiotics) (Verified  Allergy, Mild, rash, 19)


     codeine (Verified  Allergy, Mild, rash, 19)


     erythromycin base (Verified  Allergy, Mild, rash, 19)


     morphine (Verified  Allergy, Mild, rash, 19)











MI HURTADO MD       Oct 28, 2021 20:49

## 2023-09-25 NOTE — REPVR
PROCEDURE INFORMATION: 

Exam: CT Lumbar Spine Without Contrast 

Exam date and time: 10/22/2021 1:23 AM 

Age: 76 years old 

Clinical indication: Sciatica and other: Right l3/4 radicular pain 



TECHNIQUE: 

Imaging protocol: Computed tomography images of the lumbar spine without 

contrast. 

Radiation optimization: All CT scans at this facility use at least one of these 

dose optimization techniques: automated exposure control; mA and/or kV 

adjustment per patient size (includes targeted exams where dose is matched to 

clinical indication); or iterative reconstruction. 



COMPARISON: 

CR Spine. Lumbosacral, complete 12/2/2019 12:52 PM 



FINDINGS: 

Vertebrae: Thoracolumbar scoliosis appears similar to the prior radiographs. 

There is grade 1 anterolisthesis at L4-L5 and L5-S1 and grade 1 retrolisthesis 

at L1-L2 and L2-L3. Mild kyphosis at the T12-L1 level. No acute compression 

fracture is seen. 

Discs/Spinal canal/Neural foramina: Advanced discogenic and facet degenerative 

changes throughout the lumbar spine and lower thoracic spine. There is severe 

central spinal canal stenosis at L3-L4 and L4-L5. Broad-based posterior disc 

bulge at L4-L5. Milder disc bulging at other levels. Milder central spinal 

stenosis is present in the lower thoracic spine and other lumbar levels. 

Multilevel moderate to severe neural foraminal stenosis throughout the lumbar 

spine. 



Mediastinum: There is a large hiatal hernia. 

Soft tissues: Unremarkable. Mild interloop ascites. 



IMPRESSION: 

1. Advanced degenerative spondylosis as above with severe central spinal canal 

stenosis, worst at L3-L4 and L4-L5. 

2. Multilevel moderate to severe neural foraminal stenoses. 

3. Stable thoracolumbar scoliosis. 



Electronically signed by: Damon Diaz On 10/22/2021  04:23:44 AM DISPLAY PLAN FREE TEXT DISPLAY PLAN FREE TEXT DISPLAY PLAN FREE TEXT DISPLAY PLAN FREE TEXT